# Patient Record
Sex: FEMALE | Race: WHITE | NOT HISPANIC OR LATINO | Employment: UNEMPLOYED | URBAN - METROPOLITAN AREA
[De-identification: names, ages, dates, MRNs, and addresses within clinical notes are randomized per-mention and may not be internally consistent; named-entity substitution may affect disease eponyms.]

---

## 2017-07-05 ENCOUNTER — TELEPHONE (OUTPATIENT)
Dept: OBSTETRICS AND GYNECOLOGY | Facility: CLINIC | Age: 22
End: 2017-07-05

## 2017-07-05 NOTE — TELEPHONE ENCOUNTER
----- Message from Urszula Jerry sent at 7/5/2017  2:29 PM CDT -----  Pt at [phone]//states she had left a message on Monday and has not gotten a return call//on Saturday while she is in the bathtub she had bad cramps and then a big glob????came out//is calling to discuss with //please call asap//jessica/gee

## 2017-07-18 ENCOUNTER — OFFICE VISIT (OUTPATIENT)
Dept: OBSTETRICS AND GYNECOLOGY | Facility: CLINIC | Age: 22
End: 2017-07-18
Payer: COMMERCIAL

## 2017-07-18 ENCOUNTER — LAB VISIT (OUTPATIENT)
Dept: LAB | Facility: HOSPITAL | Age: 22
End: 2017-07-18
Attending: OBSTETRICS & GYNECOLOGY
Payer: COMMERCIAL

## 2017-07-18 VITALS — DIASTOLIC BLOOD PRESSURE: 72 MMHG | SYSTOLIC BLOOD PRESSURE: 104 MMHG | HEART RATE: 78 BPM | WEIGHT: 147.69 LBS

## 2017-07-18 DIAGNOSIS — N92.6 IRREGULAR BLEEDING: ICD-10-CM

## 2017-07-18 DIAGNOSIS — Z30.44 ENCOUNTER FOR SURVEILLANCE OF VAGINAL RING HORMONAL CONTRACEPTIVE DEVICE: ICD-10-CM

## 2017-07-18 DIAGNOSIS — N92.6 IRREGULAR BLEEDING: Primary | ICD-10-CM

## 2017-07-18 LAB — HCG INTACT+B SERPL-ACNC: <1.2 MIU/ML

## 2017-07-18 PROCEDURE — 99999 PR PBB SHADOW E&M-EST. PATIENT-LVL II: CPT | Mod: PBBFAC,,, | Performed by: OBSTETRICS & GYNECOLOGY

## 2017-07-18 PROCEDURE — 99214 OFFICE O/P EST MOD 30 MIN: CPT | Mod: S$GLB,,, | Performed by: OBSTETRICS & GYNECOLOGY

## 2017-07-18 PROCEDURE — 84702 CHORIONIC GONADOTROPIN TEST: CPT

## 2017-07-18 PROCEDURE — 36415 COLL VENOUS BLD VENIPUNCTURE: CPT | Mod: PO

## 2017-07-18 RX ORDER — ETONOGESTREL AND ETHINYL ESTRADIOL VAGINAL RING .015; .12 MG/D; MG/D
1 RING VAGINAL
Qty: 1 EACH | Refills: 5 | Status: SHIPPED | OUTPATIENT
Start: 2017-07-18 | End: 2017-09-18

## 2017-07-18 NOTE — PROGRESS NOTES
"Subjective:       Patient ID: Ghazala Mera is a 21 y.o. female.    Chief Complaint:  No chief complaint on file.      History of Present Illness  HPI  here for problem   Using nuva ring for contraception; reports usually placing new ring q 30d--"just takes one out and replaces with another q 30 d"  May only have 1-2 days of light bleeding  Usually places ring on the 28th of the month; had intense cramping for 10 d after ring placed, so removed the ring; 2 wks (around 7/1/17) later passed 3-4 cm tissue like glob that covered the drain--  Cramping then subsided  Has not had sex since; did not place another nuva ring    Last pap 10/20/2016--negative  GYN & OB History  No LMP recorded.   Date of Last Pap: No result found    OB History   No data available       Review of Systems  Review of Systems   Constitutional: Negative for activity change, appetite change, chills, diaphoresis, fatigue, fever and unexpected weight change.   HENT: Negative for mouth sores and tinnitus.    Eyes: Negative for discharge and visual disturbance.   Respiratory: Negative for cough, shortness of breath and wheezing.    Cardiovascular: Negative for chest pain, palpitations and leg swelling.   Gastrointestinal: Negative for abdominal pain, bloating, blood in stool, constipation, diarrhea, nausea and vomiting.   Endocrine: Negative for diabetes, hair loss, hot flashes, hyperthyroidism and hypothyroidism.   Genitourinary: Positive for menstrual problem. Negative for decreased libido, dyspareunia, dysuria, flank pain, frequency, genital sores, hematuria, menorrhagia, pelvic pain, urgency, vaginal bleeding, vaginal discharge, vaginal pain, dysmenorrhea, urinary incontinence, postcoital bleeding, postmenopausal bleeding and vaginal odor.   Musculoskeletal: Negative for back pain and myalgias.   Skin:  Negative for rash, no acne and hair changes.   Neurological: Negative for seizures, syncope, numbness and headaches.   Hematological: Negative for " adenopathy. Does not bruise/bleed easily.   Psychiatric/Behavioral: Negative for depression and sleep disturbance. The patient is not nervous/anxious.    Breast: Negative for breast mass, breast pain, nipple discharge and skin changes          Objective:    Physical Exam:   Constitutional: She appears well-developed.     Eyes: Conjunctivae and EOM are normal. Pupils are equal, round, and reactive to light.    Neck: Normal range of motion. Neck supple.     Pulmonary/Chest: Effort normal.        Abdominal: Soft.             Musculoskeletal: Normal range of motion.       Neurological: She is alert.    Skin: Skin is warm.    Psychiatric: She has a normal mood and affect.          Assessment  Encounter Diagnoses   Name Primary?    Irregular bleeding Yes    Encounter for surveillance of vaginal ring hormonal contraceptive device                Plan:      Reviewed with pt the correct use of nuva ring; can use as continuous cycle; but should replace q 21 d not q 30d (would be placing 2 days late each month)  reveiwed Sunday start after nuva ring; safe sex  [facility] today  Reassurance given   well woman exam due in november

## 2017-09-18 ENCOUNTER — OFFICE VISIT (OUTPATIENT)
Dept: OBSTETRICS AND GYNECOLOGY | Facility: CLINIC | Age: 22
End: 2017-09-18
Payer: COMMERCIAL

## 2017-09-18 VITALS
HEIGHT: 63 IN | BODY MASS INDEX: 27.93 KG/M2 | SYSTOLIC BLOOD PRESSURE: 97 MMHG | DIASTOLIC BLOOD PRESSURE: 55 MMHG | WEIGHT: 157.63 LBS

## 2017-09-18 DIAGNOSIS — Z32.01 POSITIVE PREGNANCY TEST: Primary | ICD-10-CM

## 2017-09-18 PROCEDURE — 99213 OFFICE O/P EST LOW 20 MIN: CPT | Mod: S$GLB,,, | Performed by: OBSTETRICS & GYNECOLOGY

## 2017-09-18 PROCEDURE — 3008F BODY MASS INDEX DOCD: CPT | Mod: S$GLB,,, | Performed by: OBSTETRICS & GYNECOLOGY

## 2017-09-18 PROCEDURE — 99999 PR PBB SHADOW E&M-EST. PATIENT-LVL II: CPT | Mod: PBBFAC,,, | Performed by: OBSTETRICS & GYNECOLOGY

## 2017-09-18 RX ORDER — PROGESTERONE 100 MG/1
100 CAPSULE ORAL NIGHTLY
Qty: 30 CAPSULE | Refills: 3 | Status: SHIPPED | OUTPATIENT
Start: 2017-09-18 | End: 2018-03-06 | Stop reason: ALTCHOICE

## 2017-09-18 NOTE — PROGRESS NOTES
Subjective:       Patient ID: Ghazala Mera is a 22 y.o. female.    Chief Complaint:  Possible Pregnancy      History of Present Illness  HPI  Missed Menses/ Possible Pregnancy  Patient complains of amenorrhea. She believes she could be pregnant. Pregnancy is desired. Sexual Activity: single partner, contraception: none. Current symptoms also include: positive home pregnancy test. Last period was normal.     Patient's last menstrual period was 2017.     Worried because she has had 2 early sab  Taking prenatal vitamin daily          GYN & OB HistoryPatient's last menstrual period was 2017.   Date of Last Pap: No result found    OB History    Para Term  AB Living   2       2     SAB TAB Ectopic Multiple Live Births   2              # Outcome Date GA Lbr Boyd/2nd Weight Sex Delivery Anes PTL Lv   2 SAB            1 SAB                   Review of Systems  Review of Systems   Constitutional: Negative for activity change, appetite change, chills, diaphoresis, fatigue, fever and unexpected weight change.   HENT: Negative for mouth sores and tinnitus.    Eyes: Negative for discharge and visual disturbance.   Respiratory: Negative for cough, shortness of breath and wheezing.    Cardiovascular: Negative for chest pain, palpitations and leg swelling.   Gastrointestinal: Negative for abdominal pain, bloating, blood in stool, constipation, diarrhea, nausea and vomiting.   Endocrine: Negative for diabetes, hair loss, hot flashes, hyperthyroidism and hypothyroidism.   Genitourinary: Positive for menstrual problem (amenorrhea +upt). Negative for decreased libido, dyspareunia, dysuria, flank pain, frequency, genital sores, hematuria, menorrhagia, pelvic pain, urgency, vaginal bleeding, vaginal discharge, vaginal pain, dysmenorrhea, urinary incontinence, postcoital bleeding, postmenopausal bleeding and vaginal odor.   Musculoskeletal: Negative for back pain and myalgias.   Skin:  Negative for rash, no acne  and hair changes.   Neurological: Negative for seizures, syncope, numbness and headaches.   Hematological: Negative for adenopathy. Does not bruise/bleed easily.   Psychiatric/Behavioral: Negative for depression and sleep disturbance. The patient is not nervous/anxious.    Breast: Negative for breast mass, breast pain, nipple discharge and skin changes          Objective:    Physical Exam:   Constitutional: She appears well-developed.     Eyes: Conjunctivae and EOM are normal. Pupils are equal, round, and reactive to light.    Neck: Normal range of motion. Neck supple.     Pulmonary/Chest: Effort normal.        Abdominal: Soft.             Musculoskeletal: Normal range of motion.       Neurological: She is alert.    Skin: Skin is warm.    Psychiatric: She has a normal mood and affect.          Assessment:        1. Positive pregnancy test               Plan:      Continue prenatal vitamin  Accepts prometrium 100mg qhs until 13w  Ob sono for dating  Ob labs ordered  Will defer gc/ct/pap until past 13w  Ob f/u 3 wks

## 2017-09-19 ENCOUNTER — PROCEDURE VISIT (OUTPATIENT)
Dept: OBSTETRICS AND GYNECOLOGY | Facility: CLINIC | Age: 22
End: 2017-09-19
Payer: COMMERCIAL

## 2017-09-19 ENCOUNTER — LAB VISIT (OUTPATIENT)
Dept: LAB | Facility: HOSPITAL | Age: 22
End: 2017-09-19
Attending: OBSTETRICS & GYNECOLOGY
Payer: COMMERCIAL

## 2017-09-19 DIAGNOSIS — Z32.01 POSITIVE PREGNANCY TEST: ICD-10-CM

## 2017-09-19 LAB
ABO + RH BLD: NORMAL
ANION GAP SERPL CALC-SCNC: 10 MMOL/L
BASOPHILS # BLD AUTO: 0.01 K/UL
BASOPHILS NFR BLD: 0.1 %
BLD GP AB SCN CELLS X3 SERPL QL: NORMAL
BUN SERPL-MCNC: 9 MG/DL
CALCIUM SERPL-MCNC: 9.2 MG/DL
CHLORIDE SERPL-SCNC: 106 MMOL/L
CO2 SERPL-SCNC: 22 MMOL/L
CREAT SERPL-MCNC: 0.7 MG/DL
DIFFERENTIAL METHOD: ABNORMAL
EOSINOPHIL # BLD AUTO: 0.1 K/UL
EOSINOPHIL NFR BLD: 0.7 %
ERYTHROCYTE [DISTWIDTH] IN BLOOD BY AUTOMATED COUNT: 12.9 %
EST. GFR  (AFRICAN AMERICAN): >60 ML/MIN/1.73 M^2
EST. GFR  (NON AFRICAN AMERICAN): >60 ML/MIN/1.73 M^2
GLUCOSE SERPL-MCNC: 80 MG/DL
HCT VFR BLD AUTO: 36.2 %
HGB BLD-MCNC: 12.4 G/DL
LYMPHOCYTES # BLD AUTO: 2.3 K/UL
LYMPHOCYTES NFR BLD: 26.9 %
MCH RBC QN AUTO: 29.2 PG
MCHC RBC AUTO-ENTMCNC: 34.3 G/DL
MCV RBC AUTO: 85 FL
MONOCYTES # BLD AUTO: 0.5 K/UL
MONOCYTES NFR BLD: 5.6 %
NEUTROPHILS # BLD AUTO: 5.6 K/UL
NEUTROPHILS NFR BLD: 66.5 %
PLATELET # BLD AUTO: 306 K/UL
PMV BLD AUTO: 10.6 FL
POTASSIUM SERPL-SCNC: 3.4 MMOL/L
RBC # BLD AUTO: 4.24 M/UL
SODIUM SERPL-SCNC: 138 MMOL/L
WBC # BLD AUTO: 8.37 K/UL

## 2017-09-19 PROCEDURE — 76801 OB US < 14 WKS SINGLE FETUS: CPT | Mod: S$GLB,,, | Performed by: OBSTETRICS & GYNECOLOGY

## 2017-09-19 PROCEDURE — 80048 BASIC METABOLIC PNL TOTAL CA: CPT

## 2017-09-19 PROCEDURE — 86850 RBC ANTIBODY SCREEN: CPT

## 2017-09-19 PROCEDURE — 86592 SYPHILIS TEST NON-TREP QUAL: CPT

## 2017-09-19 PROCEDURE — 36415 COLL VENOUS BLD VENIPUNCTURE: CPT | Mod: PO

## 2017-09-19 PROCEDURE — 86900 BLOOD TYPING SEROLOGIC ABO: CPT

## 2017-09-19 PROCEDURE — 85025 COMPLETE CBC W/AUTO DIFF WBC: CPT

## 2017-09-19 PROCEDURE — 87340 HEPATITIS B SURFACE AG IA: CPT

## 2017-09-19 PROCEDURE — 86703 HIV-1/HIV-2 1 RESULT ANTBDY: CPT

## 2017-09-19 PROCEDURE — 86762 RUBELLA ANTIBODY: CPT

## 2017-09-20 LAB
HBV SURFACE AG SERPL QL IA: NEGATIVE
HIV 1+2 AB+HIV1 P24 AG SERPL QL IA: NEGATIVE
RUBV IGG SER-ACNC: 14.4 IU/ML
RUBV IGG SER-IMP: REACTIVE

## 2017-09-21 LAB — RPR SER QL: NORMAL

## 2017-10-02 ENCOUNTER — ROUTINE PRENATAL (OUTPATIENT)
Dept: OBSTETRICS AND GYNECOLOGY | Facility: CLINIC | Age: 22
End: 2017-10-02
Payer: COMMERCIAL

## 2017-10-02 DIAGNOSIS — Z3A.08 8 WEEKS GESTATION OF PREGNANCY: Primary | ICD-10-CM

## 2017-10-02 DIAGNOSIS — O09.291 HISTORY OF MISCARRIAGE, CURRENTLY PREGNANT, FIRST TRIMESTER: ICD-10-CM

## 2017-10-02 PROCEDURE — 99999 PR PBB SHADOW E&M-EST. PATIENT-LVL II: CPT | Mod: PBBFAC,,, | Performed by: OBSTETRICS & GYNECOLOGY

## 2017-10-02 PROCEDURE — 0500F INITIAL PRENATAL CARE VISIT: CPT | Mod: S$GLB,,, | Performed by: OBSTETRICS & GYNECOLOGY

## 2017-10-09 VITALS — DIASTOLIC BLOOD PRESSURE: 62 MMHG | SYSTOLIC BLOOD PRESSURE: 102 MMHG

## 2017-10-09 NOTE — PROGRESS NOTES
VIOLA welcomed into collaborative MD/CNM practice.   H/o x 2 early SABs. Patient slightly anxious.  Started on Prometrium 100mg HS.   Denies having any bleeding/ spotting or cramping at present.   Patient with early US to confirm viability. EDC incongruent by LMP. Use assigned EDC of 5/8/2018. Patient aware.  Reviewed all labs to date and are normal.  Taking PNV QD.  Cervical cx after 13 wks . Can do on urine.   Recommended wt gain of 25-35 #.   Has blue bag with A's-Z's or pregnancy.  Discussed nutritional needs, rest, adequate fluids.   Common discomforts discussed. Some nausea and vomiting but wnl for pregnancy and prometrium use.   All questions answered.   RTC 2 wks to auscultate FHT's for patient reassurance.

## 2017-10-16 ENCOUNTER — ROUTINE PRENATAL (OUTPATIENT)
Dept: OBSTETRICS AND GYNECOLOGY | Facility: CLINIC | Age: 22
End: 2017-10-16
Payer: COMMERCIAL

## 2017-10-16 VITALS
DIASTOLIC BLOOD PRESSURE: 64 MMHG | WEIGHT: 159.06 LBS | SYSTOLIC BLOOD PRESSURE: 100 MMHG | BODY MASS INDEX: 28.18 KG/M2

## 2017-10-16 DIAGNOSIS — Z3A.10 10 WEEKS GESTATION OF PREGNANCY: Primary | ICD-10-CM

## 2017-10-16 DIAGNOSIS — K08.89 TOOTH PAIN: ICD-10-CM

## 2017-10-16 PROCEDURE — 99999 PR PBB SHADOW E&M-EST. PATIENT-LVL III: CPT | Mod: PBBFAC,,, | Performed by: OBSTETRICS & GYNECOLOGY

## 2017-10-16 PROCEDURE — 0502F SUBSEQUENT PRENATAL CARE: CPT | Mod: S$GLB,,, | Performed by: OBSTETRICS & GYNECOLOGY

## 2017-10-16 PROCEDURE — 87591 N.GONORRHOEAE DNA AMP PROB: CPT

## 2017-10-17 LAB
C TRACH DNA SPEC QL NAA+PROBE: DETECTED
N GONORRHOEA DNA SPEC QL NAA+PROBE: NOT DETECTED

## 2017-10-18 DIAGNOSIS — A74.9 CHLAMYDIA INFECTION: Primary | ICD-10-CM

## 2017-10-18 RX ORDER — AZITHROMYCIN 500 MG/1
1000 TABLET, FILM COATED ORAL ONCE
Qty: 2 TABLET | Refills: 1 | Status: SHIPPED | OUTPATIENT
Start: 2017-10-18 | End: 2017-10-18

## 2017-10-19 ENCOUNTER — TELEPHONE (OUTPATIENT)
Dept: OBSTETRICS AND GYNECOLOGY | Facility: CLINIC | Age: 22
End: 2017-10-19

## 2017-10-19 NOTE — TELEPHONE ENCOUNTER
----- Message from Tiana Dominguez CNM sent at 10/18/2017 10:04 PM CDT -----  Jaelyn,    Can you please call the the patient above and let her know her cultures were positive for chlamydia.      I have sent in a prescription for zithromax.  Take as directed.  There is a refill to be used for her partner to take if he is not allergic to azithromycin.       Advise safe sex during pregnancy!    Thanks   Tiana

## 2017-10-19 NOTE — TELEPHONE ENCOUNTER
Patient notified that she was positive for chlamydia and that abx sent to pharmacy with refill for partner.  Patient given safe sex precautions and advised no intercourse for at least 2 wks after both treated.  Patient verbalized understanding.

## 2017-10-24 ENCOUNTER — TELEPHONE (OUTPATIENT)
Dept: OBSTETRICS AND GYNECOLOGY | Facility: CLINIC | Age: 22
End: 2017-10-24

## 2017-10-24 NOTE — TELEPHONE ENCOUNTER
Patient tested positive for chlamydia.  Patient calling to ask how long after treatment should she wait to resume intercourse.  Patient stated she and partner took medication last week.  Patient reminded that she should wait at least 2 weeks after treatment and that she should discuss further with provider at next visit.  Patient verbalized understanding.

## 2017-10-24 NOTE — TELEPHONE ENCOUNTER
----- Message from Laureen Robertson sent at 10/24/2017  8:07 AM CDT -----  Contact: Patient  Patient would like to speak to nurse regarding an itching that she is having, please call her back at [phone]. Thank you

## 2017-10-31 ENCOUNTER — TELEPHONE (OUTPATIENT)
Dept: OBSTETRICS AND GYNECOLOGY | Facility: CLINIC | Age: 22
End: 2017-10-31

## 2017-10-31 NOTE — TELEPHONE ENCOUNTER
Left messages for the pt. to call back. krystyna tamez    Patient would like to come in to have labs done she thinks she may have a Bladder or UTI infection, Please call her at [phone].

## 2017-11-07 ENCOUNTER — ROUTINE PRENATAL (OUTPATIENT)
Dept: OBSTETRICS AND GYNECOLOGY | Facility: CLINIC | Age: 22
End: 2017-11-07
Payer: COMMERCIAL

## 2017-11-07 VITALS — SYSTOLIC BLOOD PRESSURE: 100 MMHG | DIASTOLIC BLOOD PRESSURE: 68 MMHG | WEIGHT: 158 LBS | BODY MASS INDEX: 27.99 KG/M2

## 2017-11-07 DIAGNOSIS — A74.9 CHLAMYDIA INFECTION: ICD-10-CM

## 2017-11-07 DIAGNOSIS — Z36.89 ENCOUNTER FOR FETAL ANATOMIC SURVEY: ICD-10-CM

## 2017-11-07 DIAGNOSIS — R51.9 FREQUENT HEADACHES: ICD-10-CM

## 2017-11-07 DIAGNOSIS — Z3A.14 14 WEEKS GESTATION OF PREGNANCY: Primary | ICD-10-CM

## 2017-11-07 DIAGNOSIS — O21.9 NAUSEA AND VOMITING IN PREGNANCY: ICD-10-CM

## 2017-11-07 DIAGNOSIS — R35.0 URINARY FREQUENCY: ICD-10-CM

## 2017-11-07 PROCEDURE — 0502F SUBSEQUENT PRENATAL CARE: CPT | Mod: S$GLB,,, | Performed by: OBSTETRICS & GYNECOLOGY

## 2017-11-07 PROCEDURE — 87086 URINE CULTURE/COLONY COUNT: CPT

## 2017-11-07 PROCEDURE — 99999 PR PBB SHADOW E&M-EST. PATIENT-LVL III: CPT | Mod: PBBFAC,,, | Performed by: OBSTETRICS & GYNECOLOGY

## 2017-11-07 RX ORDER — BUTALBITAL, ACETAMINOPHEN AND CAFFEINE 50; 325; 40 MG/1; MG/1; MG/1
1 TABLET ORAL EVERY 4 HOURS PRN
Qty: 20 TABLET | Refills: 0 | Status: SHIPPED | OUTPATIENT
Start: 2017-11-07 | End: 2017-12-07

## 2017-11-07 RX ORDER — PROMETHAZINE HYDROCHLORIDE 25 MG/1
25 TABLET ORAL EVERY 6 HOURS PRN
Qty: 20 TABLET | Refills: 1 | Status: SHIPPED | OUTPATIENT
Start: 2017-11-07 | End: 2018-06-21

## 2017-11-07 NOTE — PROGRESS NOTES
H/o frequency and dysuria, never came in for UA.  This better, but having n/v and headaches.  No fever or flank pain.   Only lost 1#.  Tylenol ineffective.  Will do Urine cx today.   H/o chlamy. Both she and partner tx. ARMAND next ov.  Anatomy scan and cervical length next ov.  Stop prometrium as no noted bleeding or evidence of pending miscarriage.   Doing well otherwise.   RTC 4 wks.

## 2017-11-08 LAB — BACTERIA UR CULT: NO GROWTH

## 2017-11-13 ENCOUNTER — ROUTINE PRENATAL (OUTPATIENT)
Dept: OBSTETRICS AND GYNECOLOGY | Facility: CLINIC | Age: 22
End: 2017-11-13
Payer: COMMERCIAL

## 2017-11-13 VITALS — SYSTOLIC BLOOD PRESSURE: 95 MMHG | WEIGHT: 158 LBS | DIASTOLIC BLOOD PRESSURE: 62 MMHG | BODY MASS INDEX: 27.99 KG/M2

## 2017-11-13 DIAGNOSIS — Z3A.14 14 WEEKS GESTATION OF PREGNANCY: Primary | ICD-10-CM

## 2017-11-13 PROCEDURE — 0502F SUBSEQUENT PRENATAL CARE: CPT | Mod: S$GLB,,, | Performed by: OBSTETRICS & GYNECOLOGY

## 2017-11-13 PROCEDURE — 99999 PR PBB SHADOW E&M-EST. PATIENT-LVL II: CPT | Mod: PBBFAC,,, | Performed by: OBSTETRICS & GYNECOLOGY

## 2017-11-13 NOTE — PROGRESS NOTES
"Urine culture negative.  Took Fioricet once this past week and "really helped".  Doing well!.  See 3 wks for US, cervical length, repeat Culture and ov.  To call if having problems till then.     "

## 2017-12-04 ENCOUNTER — PROCEDURE VISIT (OUTPATIENT)
Dept: OBSTETRICS AND GYNECOLOGY | Facility: CLINIC | Age: 22
End: 2017-12-04
Payer: COMMERCIAL

## 2017-12-04 ENCOUNTER — ROUTINE PRENATAL (OUTPATIENT)
Dept: OBSTETRICS AND GYNECOLOGY | Facility: CLINIC | Age: 22
End: 2017-12-04
Payer: COMMERCIAL

## 2017-12-04 VITALS
WEIGHT: 169.31 LBS | BODY MASS INDEX: 29.99 KG/M2 | DIASTOLIC BLOOD PRESSURE: 61 MMHG | SYSTOLIC BLOOD PRESSURE: 100 MMHG

## 2017-12-04 DIAGNOSIS — F32.A DEPRESSION AFFECTING PREGNANCY: ICD-10-CM

## 2017-12-04 DIAGNOSIS — Z36.89 ENCOUNTER FOR FETAL ANATOMIC SURVEY: ICD-10-CM

## 2017-12-04 DIAGNOSIS — Z86.19 HISTORY OF CHLAMYDIA: ICD-10-CM

## 2017-12-04 DIAGNOSIS — Z86.59 HISTORY OF DEPRESSION: ICD-10-CM

## 2017-12-04 DIAGNOSIS — Z3A.17 17 WEEKS GESTATION OF PREGNANCY: Primary | ICD-10-CM

## 2017-12-04 DIAGNOSIS — O99.340 DEPRESSION AFFECTING PREGNANCY: ICD-10-CM

## 2017-12-04 PROCEDURE — 76805 OB US >/= 14 WKS SNGL FETUS: CPT | Mod: S$GLB,,, | Performed by: OBSTETRICS & GYNECOLOGY

## 2017-12-04 PROCEDURE — 87591 N.GONORRHOEAE DNA AMP PROB: CPT

## 2017-12-04 PROCEDURE — 99999 PR PBB SHADOW E&M-EST. PATIENT-LVL III: CPT | Mod: PBBFAC,,, | Performed by: OBSTETRICS & GYNECOLOGY

## 2017-12-04 PROCEDURE — 0502F SUBSEQUENT PRENATAL CARE: CPT | Mod: S$GLB,,, | Performed by: OBSTETRICS & GYNECOLOGY

## 2017-12-04 RX ORDER — FLUOXETINE HYDROCHLORIDE 20 MG/1
20 CAPSULE ORAL DAILY
Qty: 30 CAPSULE | Refills: 2 | Status: SHIPPED | OUTPATIENT
Start: 2017-12-04 | End: 2020-06-09

## 2017-12-04 NOTE — PROGRESS NOTES
"Anatomy U/S today- bpm, breech, placenta posterior, normal amount of amniotic fluid,  g, visualized anatomy WNL. Supoptimal views of nose/lip, heart and ACI, will repeat scan in 1 month, cervical length 44mm  Denies cramping, bleeding, lof  Reports quickening  ARMAND for + Chlamydia completed  States has been feeling emotionally unstable lately and requests a RX for Prozac, states she has taken in the past and has worked well for her, denies suicidal ideation, states "I just feel all over the place."  Rx for Prozac sent to preferred pharmacy, patient will report worsening symptoms, thoughts of suicide  Rx for 20 mg Q day, 30 tablets, 2 refills  Discussed CDC recommendation for the flu vaccine, declines at this time  RTC in 1 week for f/u regarding new Rx for Prozac  RTC in 1 month for routine OB and u/s for supoptimal views  A. Natali SNM      "

## 2017-12-05 LAB
C TRACH DNA SPEC QL NAA+PROBE: NOT DETECTED
N GONORRHOEA DNA SPEC QL NAA+PROBE: NOT DETECTED

## 2017-12-11 ENCOUNTER — ROUTINE PRENATAL (OUTPATIENT)
Dept: OBSTETRICS AND GYNECOLOGY | Facility: CLINIC | Age: 22
End: 2017-12-11
Payer: COMMERCIAL

## 2017-12-11 VITALS
SYSTOLIC BLOOD PRESSURE: 111 MMHG | WEIGHT: 169.88 LBS | BODY MASS INDEX: 30.09 KG/M2 | DIASTOLIC BLOOD PRESSURE: 45 MMHG

## 2017-12-11 DIAGNOSIS — Z3A.18 18 WEEKS GESTATION OF PREGNANCY: Primary | ICD-10-CM

## 2017-12-11 DIAGNOSIS — F32.A DEPRESSION AFFECTING PREGNANCY IN SECOND TRIMESTER, ANTEPARTUM: ICD-10-CM

## 2017-12-11 DIAGNOSIS — O99.342 DEPRESSION AFFECTING PREGNANCY IN SECOND TRIMESTER, ANTEPARTUM: ICD-10-CM

## 2017-12-11 PROCEDURE — 0502F SUBSEQUENT PRENATAL CARE: CPT | Mod: S$GLB,,, | Performed by: OBSTETRICS & GYNECOLOGY

## 2017-12-11 PROCEDURE — 99999 PR PBB SHADOW E&M-EST. PATIENT-LVL III: CPT | Mod: PBBFAC,,, | Performed by: OBSTETRICS & GYNECOLOGY

## 2017-12-11 NOTE — PROGRESS NOTES
"Patient here for f/u regarding new Rx of Prozac. States she feels "a little better," but knows that full effect of medication takes more time. States she does not feel worse, no suicidal thoughts. Encouraged to call immediately with worsening depression.   Denies cramping, bleeding, lof  Diastolic BP noted to be 45, patient asymptomatic  Informed of negative Chlamydia ARMAND  RTC on 12/20/17 for routine OB and repeat U/S for suboptimal views  A. Natali SNM    "

## 2018-01-02 ENCOUNTER — ROUTINE PRENATAL (OUTPATIENT)
Dept: OBSTETRICS AND GYNECOLOGY | Facility: CLINIC | Age: 23
End: 2018-01-02
Payer: COMMERCIAL

## 2018-01-02 ENCOUNTER — PROCEDURE VISIT (OUTPATIENT)
Dept: OBSTETRICS AND GYNECOLOGY | Facility: CLINIC | Age: 23
End: 2018-01-02
Payer: COMMERCIAL

## 2018-01-02 VITALS — WEIGHT: 179 LBS | BODY MASS INDEX: 31.71 KG/M2 | DIASTOLIC BLOOD PRESSURE: 63 MMHG | SYSTOLIC BLOOD PRESSURE: 98 MMHG

## 2018-01-02 DIAGNOSIS — Z3A.22 22 WEEKS GESTATION OF PREGNANCY: Primary | ICD-10-CM

## 2018-01-02 DIAGNOSIS — Z3A.17 17 WEEKS GESTATION OF PREGNANCY: ICD-10-CM

## 2018-01-02 PROBLEM — K08.89 TOOTH PAIN: Status: RESOLVED | Noted: 2017-10-16 | Resolved: 2018-01-02

## 2018-01-02 PROCEDURE — 0502F SUBSEQUENT PRENATAL CARE: CPT | Mod: S$GLB,,, | Performed by: OBSTETRICS & GYNECOLOGY

## 2018-01-02 PROCEDURE — 76816 OB US FOLLOW-UP PER FETUS: CPT | Mod: S$GLB,,, | Performed by: OBSTETRICS & GYNECOLOGY

## 2018-01-02 PROCEDURE — 99999 PR PBB SHADOW E&M-EST. PATIENT-LVL III: CPT | Mod: PBBFAC,,, | Performed by: OBSTETRICS & GYNECOLOGY

## 2018-01-03 NOTE — PROGRESS NOTES
"US: Good growth @ 66%tile, unstable lie, post placenta 3 v cord, normal dee. All normal except still sub-opt heart view.   Will check next month with limited scan.  Declined any genetic tests to date.   Wt gain of 10+. TWG of 22#. Advised to watch caloric intake due to rapid wt gain to prevent increasing discomforts of pregnancy as swelling, lower back ache.   Patient admits eating more, but "feeling really good again since starting Prozac".  Striking nice balance in health and emotional well being at present.  Will focus on healthier choices now.  Occasional weak spells, but no syncope.   Good fm/fh.  No cramping or spotting.   RTC 1 month.   "

## 2018-02-05 ENCOUNTER — ROUTINE PRENATAL (OUTPATIENT)
Dept: OBSTETRICS AND GYNECOLOGY | Facility: CLINIC | Age: 23
End: 2018-02-05
Payer: COMMERCIAL

## 2018-02-05 ENCOUNTER — PROCEDURE VISIT (OUTPATIENT)
Dept: OBSTETRICS AND GYNECOLOGY | Facility: CLINIC | Age: 23
End: 2018-02-05
Payer: COMMERCIAL

## 2018-02-05 VITALS
DIASTOLIC BLOOD PRESSURE: 78 MMHG | WEIGHT: 187.38 LBS | SYSTOLIC BLOOD PRESSURE: 118 MMHG | BODY MASS INDEX: 33.19 KG/M2

## 2018-02-05 DIAGNOSIS — Z34.02 ENCOUNTER FOR SUPERVISION OF NORMAL FIRST PREGNANCY IN SECOND TRIMESTER: Primary | ICD-10-CM

## 2018-02-05 DIAGNOSIS — Z36.9 PRENATAL SCREENING ENCOUNTER: ICD-10-CM

## 2018-02-05 DIAGNOSIS — Z3A.22 22 WEEKS GESTATION OF PREGNANCY: ICD-10-CM

## 2018-02-05 PROCEDURE — 0502F SUBSEQUENT PRENATAL CARE: CPT | Mod: S$GLB,,, | Performed by: OBSTETRICS & GYNECOLOGY

## 2018-02-05 PROCEDURE — 99999 PR PBB SHADOW E&M-EST. PATIENT-LVL III: CPT | Mod: PBBFAC,,, | Performed by: OBSTETRICS & GYNECOLOGY

## 2018-02-05 PROCEDURE — 76816 OB US FOLLOW-UP PER FETUS: CPT | Mod: S$GLB,,, | Performed by: OBSTETRICS & GYNECOLOGY

## 2018-02-05 NOTE — PROGRESS NOTES
+fetal movement, no srom, no vag bleeding  F/u sono wnl; lvot wnl; anatomy survey complete  C/o nausea; declines zofran and phenergan  Glucola/cbc ordered  reveiwed kick counts/ptl  Ob class schedule given;   Discuss tdap next visit

## 2018-02-20 ENCOUNTER — LAB VISIT (OUTPATIENT)
Dept: LAB | Facility: HOSPITAL | Age: 23
End: 2018-02-20
Attending: OBSTETRICS & GYNECOLOGY
Payer: COMMERCIAL

## 2018-02-20 ENCOUNTER — ROUTINE PRENATAL (OUTPATIENT)
Dept: OBSTETRICS AND GYNECOLOGY | Facility: CLINIC | Age: 23
End: 2018-02-20
Payer: COMMERCIAL

## 2018-02-20 VITALS
BODY MASS INDEX: 33.78 KG/M2 | SYSTOLIC BLOOD PRESSURE: 108 MMHG | WEIGHT: 190.69 LBS | DIASTOLIC BLOOD PRESSURE: 62 MMHG

## 2018-02-20 DIAGNOSIS — Z36.9 PRENATAL SCREENING ENCOUNTER: ICD-10-CM

## 2018-02-20 DIAGNOSIS — Z34.03 ENCOUNTER FOR SUPERVISION OF NORMAL FIRST PREGNANCY IN THIRD TRIMESTER: Primary | ICD-10-CM

## 2018-02-20 LAB
ERYTHROCYTE [DISTWIDTH] IN BLOOD BY AUTOMATED COUNT: 13.1 %
GLUCOSE SERPL-MCNC: 67 MG/DL
HCT VFR BLD AUTO: 32.3 %
HGB BLD-MCNC: 10.3 G/DL
MCH RBC QN AUTO: 29.3 PG
MCHC RBC AUTO-ENTMCNC: 31.9 G/DL
MCV RBC AUTO: 92 FL
PLATELET # BLD AUTO: 312 K/UL
PMV BLD AUTO: 10.4 FL
RBC # BLD AUTO: 3.52 M/UL
WBC # BLD AUTO: 11.83 K/UL

## 2018-02-20 PROCEDURE — 85027 COMPLETE CBC AUTOMATED: CPT

## 2018-02-20 PROCEDURE — 82950 GLUCOSE TEST: CPT

## 2018-02-20 PROCEDURE — 90471 IMMUNIZATION ADMIN: CPT | Mod: S$GLB,,, | Performed by: OBSTETRICS & GYNECOLOGY

## 2018-02-20 PROCEDURE — 36415 COLL VENOUS BLD VENIPUNCTURE: CPT | Mod: PO

## 2018-02-20 PROCEDURE — 90715 TDAP VACCINE 7 YRS/> IM: CPT | Mod: S$GLB,,, | Performed by: OBSTETRICS & GYNECOLOGY

## 2018-02-20 PROCEDURE — 99999 PR PBB SHADOW E&M-EST. PATIENT-LVL III: CPT | Mod: PBBFAC,,, | Performed by: OBSTETRICS & GYNECOLOGY

## 2018-02-20 PROCEDURE — 0502F SUBSEQUENT PRENATAL CARE: CPT | Mod: S$GLB,,, | Performed by: OBSTETRICS & GYNECOLOGY

## 2018-02-20 NOTE — PROGRESS NOTES
+fetal movement, no srom, no vag bleeding  Glucola/cbc today  tdap today  Plans mini pill then patch for contraception  reveiwed keep baby close  Feels great!

## 2018-02-21 ENCOUNTER — PATIENT MESSAGE (OUTPATIENT)
Dept: OBSTETRICS AND GYNECOLOGY | Facility: HOSPITAL | Age: 23
End: 2018-02-21

## 2018-03-05 ENCOUNTER — TELEPHONE (OUTPATIENT)
Dept: OBSTETRICS AND GYNECOLOGY | Facility: CLINIC | Age: 23
End: 2018-03-05

## 2018-03-05 NOTE — TELEPHONE ENCOUNTER
Pt called stating she had an episode on nausea with fatigue and sweating. I explained those are common symptoms following vomiting. She's been advised to take her phenergan for nausea. Continue to monitor her symptoms and if anything worsens go to L&D. Pt verbalizes understanding.

## 2018-03-06 ENCOUNTER — ROUTINE PRENATAL (OUTPATIENT)
Dept: OBSTETRICS AND GYNECOLOGY | Facility: CLINIC | Age: 23
End: 2018-03-06
Payer: COMMERCIAL

## 2018-03-06 VITALS — BODY MASS INDEX: 34.33 KG/M2 | WEIGHT: 193.81 LBS | DIASTOLIC BLOOD PRESSURE: 64 MMHG | SYSTOLIC BLOOD PRESSURE: 90 MMHG

## 2018-03-06 DIAGNOSIS — Z34.93 NORMAL PREGNANCY IN THIRD TRIMESTER: Primary | ICD-10-CM

## 2018-03-06 DIAGNOSIS — Z3A.31 31 WEEKS GESTATION OF PREGNANCY: ICD-10-CM

## 2018-03-06 DIAGNOSIS — O09.291 HISTORY OF MISCARRIAGE, CURRENTLY PREGNANT, FIRST TRIMESTER: ICD-10-CM

## 2018-03-06 DIAGNOSIS — A74.9 CHLAMYDIA INFECTION: ICD-10-CM

## 2018-03-06 PROCEDURE — 99999 PR PBB SHADOW E&M-EST. PATIENT-LVL II: CPT | Mod: PBBFAC,,, | Performed by: MIDWIFE

## 2018-03-06 PROCEDURE — 0502F SUBSEQUENT PRENATAL CARE: CPT | Mod: S$GLB,,, | Performed by: MIDWIFE

## 2018-03-06 NOTE — PROGRESS NOTES
Doing well, good fm, pt having episodes of n/v, sweating, probably hypoglycemia after diet recall, encouraged protein increase, otherwise well, rtc 2 wks

## 2018-03-20 ENCOUNTER — ROUTINE PRENATAL (OUTPATIENT)
Dept: OBSTETRICS AND GYNECOLOGY | Facility: CLINIC | Age: 23
End: 2018-03-20
Payer: COMMERCIAL

## 2018-03-20 VITALS — BODY MASS INDEX: 34.37 KG/M2 | DIASTOLIC BLOOD PRESSURE: 69 MMHG | SYSTOLIC BLOOD PRESSURE: 105 MMHG | WEIGHT: 194 LBS

## 2018-03-20 DIAGNOSIS — O26.893 DYSURIA DURING PREGNANCY IN THIRD TRIMESTER: ICD-10-CM

## 2018-03-20 DIAGNOSIS — E16.2 HYPOGLYCEMIA: ICD-10-CM

## 2018-03-20 DIAGNOSIS — R30.0 DYSURIA DURING PREGNANCY IN THIRD TRIMESTER: ICD-10-CM

## 2018-03-20 DIAGNOSIS — Z86.19 HISTORY OF CHLAMYDIA: ICD-10-CM

## 2018-03-20 DIAGNOSIS — Z34.80 ENCOUNTER FOR SUPERVISION OF NORMAL PREGNANCY IN MULTIGRAVIDA: Primary | ICD-10-CM

## 2018-03-20 LAB
AMORPH CRY UR QL COMP ASSIST: ABNORMAL
BACTERIA #/AREA URNS AUTO: ABNORMAL /HPF
BILIRUB UR QL STRIP: NEGATIVE
CLARITY UR REFRACT.AUTO: ABNORMAL
COLOR UR AUTO: ABNORMAL
GLUCOSE UR QL STRIP: NEGATIVE
HGB UR QL STRIP: NEGATIVE
KETONES UR QL STRIP: NEGATIVE
LEUKOCYTE ESTERASE UR QL STRIP: ABNORMAL
MICROSCOPIC COMMENT: ABNORMAL
NITRITE UR QL STRIP: NEGATIVE
PH UR STRIP: 5 [PH] (ref 5–8)
PROT UR QL STRIP: NEGATIVE
SP GR UR STRIP: 1.02 (ref 1–1.03)
SQUAMOUS #/AREA URNS AUTO: 8 /HPF
URN SPEC COLLECT METH UR: ABNORMAL
UROBILINOGEN UR STRIP-ACNC: NEGATIVE EU/DL
WBC #/AREA URNS AUTO: 8 /HPF (ref 0–5)

## 2018-03-20 PROCEDURE — 99999 PR PBB SHADOW E&M-EST. PATIENT-LVL III: CPT | Mod: PBBFAC,,, | Performed by: ADVANCED PRACTICE MIDWIFE

## 2018-03-20 PROCEDURE — 81001 URINALYSIS AUTO W/SCOPE: CPT

## 2018-03-20 PROCEDURE — 0502F SUBSEQUENT PRENATAL CARE: CPT | Mod: S$GLB,,, | Performed by: ADVANCED PRACTICE MIDWIFE

## 2018-03-20 PROCEDURE — 87491 CHLMYD TRACH DNA AMP PROBE: CPT

## 2018-03-20 PROCEDURE — 87086 URINE CULTURE/COLONY COUNT: CPT

## 2018-03-20 NOTE — PROGRESS NOTES
"Doing well, good fetal movement. Denies contractions.  Continues to have hypoglycemic episodes. Instructed to eat protein rich snacks every 3 hours and increase water intake.   Also reports one episode of "leaking" on Friday night when stood up and had fluid running down her leg and soaked her shorts. No further episodes of leaking, no bleeding, no cramping.   Nitrazine negative and Fern negative.  Doing well on Prozac.   Encouraged to take BF and child birth class.       Coffective counseling sheet Fall In Love discussed with mother. Reinforced immediate skin to skin, the magic first hour, importance of the first feeding and delaying routine procedures. Encouraged mother to download Coffective mobile janell if she has not already done so. Mother verbalizes understanding.      "

## 2018-03-21 LAB
C TRACH DNA SPEC QL NAA+PROBE: NOT DETECTED
N GONORRHOEA DNA SPEC QL NAA+PROBE: NOT DETECTED

## 2018-03-22 LAB — BACTERIA UR CULT: NORMAL

## 2018-04-11 ENCOUNTER — ROUTINE PRENATAL (OUTPATIENT)
Dept: OBSTETRICS AND GYNECOLOGY | Facility: CLINIC | Age: 23
End: 2018-04-11
Payer: COMMERCIAL

## 2018-04-11 ENCOUNTER — PROCEDURE VISIT (OUTPATIENT)
Dept: OBSTETRICS AND GYNECOLOGY | Facility: CLINIC | Age: 23
End: 2018-04-11
Payer: COMMERCIAL

## 2018-04-11 VITALS
SYSTOLIC BLOOD PRESSURE: 113 MMHG | DIASTOLIC BLOOD PRESSURE: 65 MMHG | BODY MASS INDEX: 35.54 KG/M2 | WEIGHT: 200.63 LBS

## 2018-04-11 DIAGNOSIS — O40.3XX0 POLYHYDRAMNIOS IN THIRD TRIMESTER COMPLICATION, SINGLE OR UNSPECIFIED FETUS: ICD-10-CM

## 2018-04-11 DIAGNOSIS — O99.213 OBESITY DURING PREGNANCY IN THIRD TRIMESTER: ICD-10-CM

## 2018-04-11 DIAGNOSIS — Z3A.36 36 WEEKS GESTATION OF PREGNANCY: ICD-10-CM

## 2018-04-11 DIAGNOSIS — O99.213 OBESITY DURING PREGNANCY IN THIRD TRIMESTER: Primary | ICD-10-CM

## 2018-04-11 PROCEDURE — 99999 PR PBB SHADOW E&M-EST. PATIENT-LVL II: CPT | Mod: PBBFAC,,, | Performed by: MIDWIFE

## 2018-04-11 PROCEDURE — 76815 OB US LIMITED FETUS(S): CPT | Mod: 59,S$GLB,, | Performed by: OBSTETRICS & GYNECOLOGY

## 2018-04-11 PROCEDURE — 87081 CULTURE SCREEN ONLY: CPT

## 2018-04-11 PROCEDURE — 76819 FETAL BIOPHYS PROFIL W/O NST: CPT | Mod: 59,S$GLB,, | Performed by: OBSTETRICS & GYNECOLOGY

## 2018-04-11 PROCEDURE — 0502F SUBSEQUENT PRENATAL CARE: CPT | Mod: S$GLB,,, | Performed by: MIDWIFE

## 2018-04-11 NOTE — PROGRESS NOTES
Doing well, good fm, GBS today, reviewed when to go to hospital, reviewed s2s, rooming in, delayed bathing, BF, pt wants to try but feels she is too modest and may not feel comfortable, US today, BPP 8/8, MVP 9.2, poly, will rescan next week, EFW 7#, 64%,  rtc 1 wk

## 2018-04-14 LAB — BACTERIA SPEC AEROBE CULT: NORMAL

## 2018-04-18 ENCOUNTER — PROCEDURE VISIT (OUTPATIENT)
Dept: OBSTETRICS AND GYNECOLOGY | Facility: CLINIC | Age: 23
End: 2018-04-18
Payer: COMMERCIAL

## 2018-04-18 ENCOUNTER — ROUTINE PRENATAL (OUTPATIENT)
Dept: OBSTETRICS AND GYNECOLOGY | Facility: CLINIC | Age: 23
End: 2018-04-18
Payer: COMMERCIAL

## 2018-04-18 VITALS — WEIGHT: 204.38 LBS | BODY MASS INDEX: 36.2 KG/M2 | SYSTOLIC BLOOD PRESSURE: 90 MMHG | DIASTOLIC BLOOD PRESSURE: 64 MMHG

## 2018-04-18 DIAGNOSIS — O40.3XX0 POLYHYDRAMNIOS IN THIRD TRIMESTER COMPLICATION, SINGLE OR UNSPECIFIED FETUS: ICD-10-CM

## 2018-04-18 DIAGNOSIS — Z3A.37 37 WEEKS GESTATION OF PREGNANCY: ICD-10-CM

## 2018-04-18 DIAGNOSIS — O40.3XX0 POLYHYDRAMNIOS IN THIRD TRIMESTER COMPLICATION, SINGLE OR UNSPECIFIED FETUS: Primary | ICD-10-CM

## 2018-04-18 PROCEDURE — 99999 PR PBB SHADOW E&M-EST. PATIENT-LVL II: CPT | Mod: PBBFAC,,, | Performed by: MIDWIFE

## 2018-04-18 PROCEDURE — 0502F SUBSEQUENT PRENATAL CARE: CPT | Mod: S$GLB,,, | Performed by: MIDWIFE

## 2018-04-18 PROCEDURE — 76819 FETAL BIOPHYS PROFIL W/O NST: CPT | Mod: S$GLB,,, | Performed by: OBSTETRICS & GYNECOLOGY

## 2018-04-18 NOTE — PROGRESS NOTES
Doing well, good fm, BPP 8/8, MVP 11.7, poly stablereviewed when to go to hospital, rtc 1 wk w/ BPP

## 2018-04-25 ENCOUNTER — PROCEDURE VISIT (OUTPATIENT)
Dept: OBSTETRICS AND GYNECOLOGY | Facility: CLINIC | Age: 23
End: 2018-04-25
Payer: COMMERCIAL

## 2018-04-25 ENCOUNTER — ROUTINE PRENATAL (OUTPATIENT)
Dept: OBSTETRICS AND GYNECOLOGY | Facility: CLINIC | Age: 23
End: 2018-04-25
Payer: COMMERCIAL

## 2018-04-25 VITALS
DIASTOLIC BLOOD PRESSURE: 64 MMHG | WEIGHT: 204.13 LBS | SYSTOLIC BLOOD PRESSURE: 110 MMHG | BODY MASS INDEX: 36.16 KG/M2

## 2018-04-25 DIAGNOSIS — O40.3XX0 POLYHYDRAMNIOS IN THIRD TRIMESTER COMPLICATION, SINGLE OR UNSPECIFIED FETUS: ICD-10-CM

## 2018-04-25 DIAGNOSIS — O40.3XX0 POLYHYDRAMNIOS IN THIRD TRIMESTER COMPLICATION, SINGLE OR UNSPECIFIED FETUS: Primary | ICD-10-CM

## 2018-04-25 DIAGNOSIS — Z3A.38 38 WEEKS GESTATION OF PREGNANCY: ICD-10-CM

## 2018-04-25 PROCEDURE — 76819 FETAL BIOPHYS PROFIL W/O NST: CPT | Mod: S$GLB,,, | Performed by: OBSTETRICS & GYNECOLOGY

## 2018-04-25 PROCEDURE — 99212 OFFICE O/P EST SF 10 MIN: CPT | Mod: TH,S$GLB,, | Performed by: MIDWIFE

## 2018-04-25 PROCEDURE — 99999 PR PBB SHADOW E&M-EST. PATIENT-LVL II: CPT | Mod: PBBFAC,,, | Performed by: MIDWIFE

## 2018-04-25 NOTE — PROGRESS NOTES
Doing well, good fm, ready for baby, BPP 8/8, MVP 8.1, RICHARD 20.6, poly improved, reviewed when to go to hospital, cervix ft/thk, pt anxious about who will be delivering, reassured pt, BPP at [facility], rtc 1 wk

## 2018-05-03 ENCOUNTER — ROUTINE PRENATAL (OUTPATIENT)
Dept: OBSTETRICS AND GYNECOLOGY | Facility: CLINIC | Age: 23
End: 2018-05-03
Payer: COMMERCIAL

## 2018-05-03 ENCOUNTER — PROCEDURE VISIT (OUTPATIENT)
Dept: OBSTETRICS AND GYNECOLOGY | Facility: CLINIC | Age: 23
End: 2018-05-03
Payer: COMMERCIAL

## 2018-05-03 VITALS — DIASTOLIC BLOOD PRESSURE: 82 MMHG | SYSTOLIC BLOOD PRESSURE: 120 MMHG | BODY MASS INDEX: 35.78 KG/M2 | WEIGHT: 202 LBS

## 2018-05-03 DIAGNOSIS — O40.3XX0 POLYHYDRAMNIOS IN THIRD TRIMESTER COMPLICATION, SINGLE OR UNSPECIFIED FETUS: ICD-10-CM

## 2018-05-03 DIAGNOSIS — O40.3XX0 POLYHYDRAMNIOS IN THIRD TRIMESTER COMPLICATION, SINGLE OR UNSPECIFIED FETUS: Primary | ICD-10-CM

## 2018-05-03 PROBLEM — O99.213 OBESITY DURING PREGNANCY IN THIRD TRIMESTER: Status: RESOLVED | Noted: 2018-03-06 | Resolved: 2018-05-03

## 2018-05-03 PROCEDURE — 0502F SUBSEQUENT PRENATAL CARE: CPT | Mod: S$GLB,,, | Performed by: ADVANCED PRACTICE MIDWIFE

## 2018-05-03 PROCEDURE — 76819 FETAL BIOPHYS PROFIL W/O NST: CPT | Mod: S$GLB,,, | Performed by: OBSTETRICS & GYNECOLOGY

## 2018-05-03 PROCEDURE — 99999 PR PBB SHADOW E&M-EST. PATIENT-LVL II: CPT | Mod: PBBFAC,,, | Performed by: ADVANCED PRACTICE MIDWIFE

## 2018-05-03 PROCEDURE — 76816 OB US FOLLOW-UP PER FETUS: CPT | Mod: S$GLB,,, | Performed by: OBSTETRICS & GYNECOLOGY

## 2018-05-03 NOTE — PROGRESS NOTES
Doing well, ready for baby   VE per pt request   Discussed IOL at 41 weeks gestation, reviewed prostaglandins including cytotec and cervidil, also reviewed R&B of pitocin IOL, will schedule at [facility]  Ultrasound today with cephalic presentation, posterior placenta, MVP 8.3cm, RICHARD 23.9cm, EFW 80%, BPP 8/8, polyhydraminos, reviewed with pt and FOB  Labor precautions and FKCs reviewed, when to go to hospital   The skin of the suprapubic region was evaluated and appears clean, dry and intact.  Counseled the patient to shower daily and to wash this area with an antibacterial soap such as Dial daily.  Advised her to not shave the hair from this area from now until after delivery.  I also counseled the patient to place antibacterial hand soap in all her bathrooms and kitchen at home to help facilitate proper hand hygiene practices before and after delivery.

## 2018-05-08 ENCOUNTER — ROUTINE PRENATAL (OUTPATIENT)
Dept: OBSTETRICS AND GYNECOLOGY | Facility: CLINIC | Age: 23
End: 2018-05-08
Payer: COMMERCIAL

## 2018-05-08 VITALS
WEIGHT: 202.81 LBS | SYSTOLIC BLOOD PRESSURE: 110 MMHG | DIASTOLIC BLOOD PRESSURE: 68 MMHG | BODY MASS INDEX: 35.93 KG/M2

## 2018-05-08 DIAGNOSIS — Z3A.40 40 WEEKS GESTATION OF PREGNANCY: ICD-10-CM

## 2018-05-08 DIAGNOSIS — O48.0 POST-TERM PREGNANCY, 40-42 WEEKS OF GESTATION: Primary | ICD-10-CM

## 2018-05-08 DIAGNOSIS — O48.0 POST-DATES PREGNANCY: ICD-10-CM

## 2018-05-08 PROCEDURE — 99212 OFFICE O/P EST SF 10 MIN: CPT | Mod: S$GLB,,, | Performed by: MIDWIFE

## 2018-05-08 PROCEDURE — 99999 PR PBB SHADOW E&M-EST. PATIENT-LVL II: CPT | Mod: PBBFAC,,, | Performed by: MIDWIFE

## 2018-05-08 RX ORDER — TERBUTALINE SULFATE 1 MG/ML
0.25 INJECTION SUBCUTANEOUS
Status: CANCELLED | OUTPATIENT
Start: 2018-05-08

## 2018-05-08 RX ORDER — ONDANSETRON 4 MG/1
8 TABLET, ORALLY DISINTEGRATING ORAL EVERY 8 HOURS PRN
Status: CANCELLED | OUTPATIENT
Start: 2018-05-08

## 2018-05-08 RX ORDER — MISOPROSTOL 100 MCG
25 TABLET ORAL EVERY 4 HOURS
Status: CANCELLED | OUTPATIENT
Start: 2018-05-08 | End: 2018-05-09

## 2018-05-08 RX ORDER — SODIUM CHLORIDE 9 MG/ML
INJECTION, SOLUTION INTRAVENOUS
Status: CANCELLED | OUTPATIENT
Start: 2018-05-08

## 2018-05-08 RX ORDER — SODIUM CHLORIDE, SODIUM LACTATE, POTASSIUM CHLORIDE, CALCIUM CHLORIDE 600; 310; 30; 20 MG/100ML; MG/100ML; MG/100ML; MG/100ML
INJECTION, SOLUTION INTRAVENOUS CONTINUOUS
Status: CANCELLED | OUTPATIENT
Start: 2018-05-08

## 2018-05-08 NOTE — PROGRESS NOTES
Doing well, good fm, ready for baby, cervix unchanged, induction scheduled for next Tuesday at [facility], orders placed, discussed induction process,  wants to know why we can't just do it now, discussed at his point waiting is beneficial to allow spont labor, voiced understanding, rtc PP

## 2018-05-13 ENCOUNTER — HOSPITAL ENCOUNTER (INPATIENT)
Facility: HOSPITAL | Age: 23
LOS: 4 days | Discharge: HOME | End: 2018-05-17
Attending: OBSTETRICS & GYNECOLOGY | Admitting: OBSTETRICS & GYNECOLOGY
Payer: COMMERCIAL

## 2018-05-13 DIAGNOSIS — O48.0 POST-TERM PREGNANCY, 40-42 WEEKS OF GESTATION: ICD-10-CM

## 2018-05-13 DIAGNOSIS — O48.0 POST-DATES PREGNANCY: ICD-10-CM

## 2018-05-13 DIAGNOSIS — O40.3XX0 POLYHYDRAMNIOS IN THIRD TRIMESTER: ICD-10-CM

## 2018-05-13 DIAGNOSIS — Z98.891 S/P CESAREAN SECTION: Primary | ICD-10-CM

## 2018-05-13 LAB
ABO + RH BLD: NORMAL
BASOPHILS # BLD AUTO: 0.01 K/UL
BASOPHILS NFR BLD: 0.1 %
BLD GP AB SCN CELLS X3 SERPL QL: NORMAL
DIFFERENTIAL METHOD: ABNORMAL
EOSINOPHIL # BLD AUTO: 0.1 K/UL
EOSINOPHIL NFR BLD: 0.5 %
ERYTHROCYTE [DISTWIDTH] IN BLOOD BY AUTOMATED COUNT: 13.7 %
HCT VFR BLD AUTO: 31.9 %
HGB BLD-MCNC: 10.6 G/DL
HIV1+2 IGG SERPL QL IA.RAPID: NEGATIVE
LYMPHOCYTES # BLD AUTO: 2.3 K/UL
LYMPHOCYTES NFR BLD: 21.4 %
MCH RBC QN AUTO: 28.3 PG
MCHC RBC AUTO-ENTMCNC: 33.2 G/DL
MCV RBC AUTO: 85 FL
MONOCYTES # BLD AUTO: 0.6 K/UL
MONOCYTES NFR BLD: 6 %
NEUTROPHILS # BLD AUTO: 7.6 K/UL
NEUTROPHILS NFR BLD: 72 %
PLATELET # BLD AUTO: 262 K/UL
PMV BLD AUTO: 10 FL
RBC # BLD AUTO: 3.74 M/UL
RPR SER QL: NORMAL
WBC # BLD AUTO: 10.5 K/UL

## 2018-05-13 PROCEDURE — 86592 SYPHILIS TEST NON-TREP QUAL: CPT

## 2018-05-13 PROCEDURE — 72100002 HC LABOR CARE, 1ST 8 HOURS

## 2018-05-13 PROCEDURE — 25000003 PHARM REV CODE 250: Performed by: ADVANCED PRACTICE MIDWIFE

## 2018-05-13 PROCEDURE — 85025 COMPLETE CBC W/AUTO DIFF WBC: CPT

## 2018-05-13 PROCEDURE — 86703 HIV-1/HIV-2 1 RESULT ANTBDY: CPT

## 2018-05-13 PROCEDURE — 86901 BLOOD TYPING SEROLOGIC RH(D): CPT

## 2018-05-13 PROCEDURE — 11000001 HC ACUTE MED/SURG PRIVATE ROOM

## 2018-05-13 RX ORDER — FLUOXETINE HYDROCHLORIDE 20 MG/1
20 CAPSULE ORAL DAILY
Status: DISCONTINUED | OUTPATIENT
Start: 2018-05-14 | End: 2018-05-13

## 2018-05-13 RX ORDER — FLUOXETINE 10 MG/1
20 CAPSULE ORAL NIGHTLY
Status: DISCONTINUED | OUTPATIENT
Start: 2018-05-13 | End: 2018-05-16

## 2018-05-13 RX ORDER — ONDANSETRON 8 MG/1
8 TABLET, ORALLY DISINTEGRATING ORAL EVERY 8 HOURS PRN
Status: DISCONTINUED | OUTPATIENT
Start: 2018-05-13 | End: 2018-05-15 | Stop reason: SDUPTHER

## 2018-05-13 RX ORDER — BUTORPHANOL TARTRATE 2 MG/ML
1 INJECTION INTRAMUSCULAR; INTRAVENOUS
Status: DISCONTINUED | OUTPATIENT
Start: 2018-05-13 | End: 2018-05-16

## 2018-05-13 RX ORDER — MISOPROSTOL 200 UG/1
600 TABLET ORAL
Status: DISCONTINUED | OUTPATIENT
Start: 2018-05-13 | End: 2018-05-16

## 2018-05-13 RX ORDER — FAMOTIDINE 20 MG/1
20 TABLET, FILM COATED ORAL 2 TIMES DAILY
Status: DISCONTINUED | OUTPATIENT
Start: 2018-05-13 | End: 2018-05-16

## 2018-05-13 RX ORDER — SODIUM CHLORIDE, SODIUM LACTATE, POTASSIUM CHLORIDE, CALCIUM CHLORIDE 600; 310; 30; 20 MG/100ML; MG/100ML; MG/100ML; MG/100ML
INJECTION, SOLUTION INTRAVENOUS CONTINUOUS
Status: DISCONTINUED | OUTPATIENT
Start: 2018-05-13 | End: 2018-05-15 | Stop reason: SDUPTHER

## 2018-05-13 RX ORDER — BUTORPHANOL TARTRATE 2 MG/ML
2 INJECTION INTRAMUSCULAR; INTRAVENOUS
Status: DISCONTINUED | OUTPATIENT
Start: 2018-05-13 | End: 2018-05-15

## 2018-05-13 RX ADMIN — FAMOTIDINE 20 MG: 20 TABLET ORAL at 08:05

## 2018-05-13 RX ADMIN — DINOPROSTONE 10 MG: 10 INSERT VAGINAL at 08:05

## 2018-05-13 RX ADMIN — FLUOXETINE 20 MG: 20 CAPSULE ORAL at 08:05

## 2018-05-13 NOTE — ASSESSMENT & PLAN NOTE
Admit to L&D  Cervidil IOL, R&B discussed with pt and family x 2, wish to proceed   Continue to monitor maternal and fetal status

## 2018-05-13 NOTE — SUBJECTIVE & OBJECTIVE
Obstetric HPI:  Patient reports Intensity: moderate contractions, active fetal movement, No vaginal bleeding , No loss of fluid     This pregnancy has been complicated by   Chlamydia +, ARMAND negative   Depression, on prozac     Obstetric History       T0      L0     SAB2   TAB0   Ectopic0   Multiple0   Live Births0       # Outcome Date GA Lbr Boyd/2nd Weight Sex Delivery Anes PTL Lv   3 Current            2 SAB            1 SAB                 No past medical history on file.  Past Surgical History:   Procedure Laterality Date    DILATION AND CURETTAGE OF UTERUS         PTA Medications   Medication Sig    FLUoxetine (PROZAC) 20 MG capsule Take 1 capsule (20 mg total) by mouth once daily.    PNV NO.5/IRON ASP GLY/DOCUS/FA (PRENATAL VIT #5-IRON-FA-DSS ORAL) Take 1 tablet by mouth once daily.    promethazine (PHENERGAN) 25 MG tablet Take 1 tablet (25 mg total) by mouth every 6 (six) hours as needed for Nausea.       Review of patient's allergies indicates:  No Known Allergies     Family History     Problem Relation (Age of Onset)    Stroke Father        Social History Main Topics    Smoking status: Former Smoker    Smokeless tobacco: Never Used    Alcohol use No    Drug use: No    Sexual activity: Yes     Partners: Male     Review of Systems   Gastrointestinal: Positive for abdominal pain.   All other systems reviewed and are negative.     Objective:     Vital Signs (Most Recent):  Pulse: 77 (18 1810)  BP: 135/72 (18 1810) Vital Signs (24h Range):  Pulse:  [77] 77  BP: (135)/(72) 135/72     Weight: 92 kg (202 lb 13.2 oz)  Body mass index is 35.93 kg/m².    FHT: 145 Cat 1 (reassuring)  TOCO:  Q 8-10 minutes    Physical Exam:   Constitutional: She is oriented to person, place, and time. Vital signs are normal. She appears well-developed and well-nourished. She is cooperative.    HENT:   Head: Normocephalic and atraumatic.     Neck: Normal range of motion.    Cardiovascular: Normal rate,  regular rhythm and normal heart sounds.     Pulmonary/Chest: Effort normal and breath sounds normal.        Abdominal: Soft.   Gravid, non-tender     Genitourinary: Vagina normal and uterus normal. Pelvic exam was performed with patient supine. Cervix is normal. Labial bartholins normal.          Musculoskeletal: Normal range of motion and moves all extremeties.       Neurological: She is alert and oriented to person, place, and time. She has normal strength.    Skin: Skin is warm, dry and intact. Capillary refill takes less than 2 seconds.    Psychiatric: She has a normal mood and affect. Her speech is normal and behavior is normal. Judgment and thought content normal. Cognition and memory are normal.       Cervix:  Dilation:  0  Effacement:  25%  Station: -3  Presentation: Vertex     Significant Labs:  Lab Results   Component Value Date    GROUPTRH O POS 09/19/2017    HEPBSAG Negative 09/19/2017    STREPBCULT No Group B Streptococcus isolated 04/11/2018       I have personallly reviewed all pertinent lab results from the last 24 hours.

## 2018-05-13 NOTE — HOSPITAL COURSE
Admit to L&D  Cervidil IOL  Begin cytotec 50mcg po  1900 Cytotec vaginally 25 mcg, leaking clear fluid nitrazine negative  2035 requesting epidural  5/15/18 -AROM Pitocin at 4mu

## 2018-05-13 NOTE — H&P
[facility] -   Obstetrics  History & Physical    Patient Name: Ghazala Mera  MRN: [id number]  Admission Date: 2018  Primary Care Provider: Halle Srivastava MD    Subjective:     Principal Problem:Polyhydramnios in third trimester    History of Present Illness:  Pt here for contractions and pressure    Obstetric HPI:  Patient reports Intensity: moderate contractions, active fetal movement, No vaginal bleeding , No loss of fluid     This pregnancy has been complicated by   Chlamydia +, ARMAND negative   Depression, on prozac     Obstetric History       T0      L0     SAB2   TAB0   Ectopic0   Multiple0   Live Births0       # Outcome Date GA Lbr Boyd/2nd Weight Sex Delivery Anes PTL Lv   3 Current            2 SAB            1 SAB                 No past medical history on file.  Past Surgical History:   Procedure Laterality Date    DILATION AND CURETTAGE OF UTERUS         PTA Medications   Medication Sig    FLUoxetine (PROZAC) 20 MG capsule Take 1 capsule (20 mg total) by mouth once daily.    PNV NO.5/IRON ASP GLY/DOCUS/FA (PRENATAL VIT #5-IRON-FA-DSS ORAL) Take 1 tablet by mouth once daily.    promethazine (PHENERGAN) 25 MG tablet Take 1 tablet (25 mg total) by mouth every 6 (six) hours as needed for Nausea.       Review of patient's allergies indicates:  No Known Allergies     Family History     Problem Relation (Age of Onset)    Stroke Father        Social History Main Topics    Smoking status: Former Smoker    Smokeless tobacco: Never Used    Alcohol use No    Drug use: No    Sexual activity: Yes     Partners: Male     Review of Systems   Gastrointestinal: Positive for abdominal pain.   All other systems reviewed and are negative.     Objective:     Vital Signs (Most Recent):  Pulse: 77 (18 1810)  BP: 135/72 (18 1810) Vital Signs (24h Range):  Pulse:  [77] 77  BP: (135)/(72) 135/72     Weight: 92 kg (202 lb 13.2 oz)  Body mass index is 35.93 kg/m².    FHT: 145  Cat 1 (reassuring)  TOCO:  Q 8-10 minutes    Physical Exam:   Constitutional: She is oriented to person, place, and time. Vital signs are normal. She appears well-developed and well-nourished. She is cooperative.    HENT:   Head: Normocephalic and atraumatic.     Neck: Normal range of motion.    Cardiovascular: Normal rate, regular rhythm and normal heart sounds.     Pulmonary/Chest: Effort normal and breath sounds normal.        Abdominal: Soft.   Gravid, non-tender     Genitourinary: Vagina normal and uterus normal. Pelvic exam was performed with patient supine. Cervix is normal. Labial bartholins normal.          Musculoskeletal: Normal range of motion and moves all extremeties.       Neurological: She is alert and oriented to person, place, and time. She has normal strength.    Skin: Skin is warm, dry and intact. Capillary refill takes less than 2 seconds.    Psychiatric: She has a normal mood and affect. Her speech is normal and behavior is normal. Judgment and thought content normal. Cognition and memory are normal.       Cervix:  Dilation:  0  Effacement:  25%  Station: -3  Presentation: Vertex     Significant Labs:  Lab Results   Component Value Date    GROUPTRH O POS 2017    HEPBSAG Negative 2017    STREPBCULT No Group B Streptococcus isolated 2018       I have personallly reviewed all pertinent lab results from the last 24 hours.    Assessment/Plan:     22 y.o. female  at 40w5d for:    Post-term pregnancy, 40-42 weeks of gestation    Admit to L&D  Cervidil IOL, R&B discussed with pt and family x 2, wish to proceed   Continue to monitor maternal and fetal status        History of depression    Prozac Daily            Chato Leung CNM  Obstetrics  [facility]

## 2018-05-14 ENCOUNTER — ANESTHESIA (OUTPATIENT)
Dept: OBSTETRICS AND GYNECOLOGY | Facility: HOSPITAL | Age: 23
End: 2018-05-14
Payer: COMMERCIAL

## 2018-05-14 ENCOUNTER — ANESTHESIA EVENT (OUTPATIENT)
Dept: OBSTETRICS AND GYNECOLOGY | Facility: HOSPITAL | Age: 23
End: 2018-05-14
Payer: COMMERCIAL

## 2018-05-14 PROCEDURE — 3E0P7VZ INTRODUCTION OF HORMONE INTO FEMALE REPRODUCTIVE, VIA NATURAL OR ARTIFICIAL OPENING: ICD-10-PCS | Performed by: OBSTETRICS & GYNECOLOGY

## 2018-05-14 PROCEDURE — 63600175 PHARM REV CODE 636 W HCPCS: Performed by: ADVANCED PRACTICE MIDWIFE

## 2018-05-14 PROCEDURE — 25000003 PHARM REV CODE 250: Performed by: NURSE ANESTHETIST, CERTIFIED REGISTERED

## 2018-05-14 PROCEDURE — 72100003 HC LABOR CARE, EA. ADDL. 8 HRS

## 2018-05-14 PROCEDURE — 25000003 PHARM REV CODE 250: Performed by: ADVANCED PRACTICE MIDWIFE

## 2018-05-14 PROCEDURE — 62326 NJX INTERLAMINAR LMBR/SAC: CPT | Performed by: NURSE ANESTHETIST, CERTIFIED REGISTERED

## 2018-05-14 PROCEDURE — 27800517 HC TRAY,EPIDURAL-CONTINUOUS: Performed by: NURSE ANESTHETIST, CERTIFIED REGISTERED

## 2018-05-14 PROCEDURE — 11000001 HC ACUTE MED/SURG PRIVATE ROOM

## 2018-05-14 PROCEDURE — 63600175 PHARM REV CODE 636 W HCPCS: Performed by: NURSE ANESTHETIST, CERTIFIED REGISTERED

## 2018-05-14 PROCEDURE — 51701 INSERT BLADDER CATHETER: CPT

## 2018-05-14 PROCEDURE — 72100002 HC LABOR CARE, 1ST 8 HOURS

## 2018-05-14 RX ORDER — FAMOTIDINE 10 MG/ML
20 INJECTION INTRAVENOUS ONCE
Status: DISCONTINUED | OUTPATIENT
Start: 2018-05-14 | End: 2018-05-16

## 2018-05-14 RX ORDER — ROPIVACAINE HYDROCHLORIDE 2 MG/ML
INJECTION, SOLUTION EPIDURAL; INFILTRATION; PERINEURAL CONTINUOUS PRN
Status: DISCONTINUED | OUTPATIENT
Start: 2018-05-14 | End: 2018-05-15

## 2018-05-14 RX ORDER — MISOPROSTOL 100 MCG
25 TABLET ORAL EVERY 6 HOURS
Status: DISCONTINUED | OUTPATIENT
Start: 2018-05-14 | End: 2018-05-15 | Stop reason: SDUPTHER

## 2018-05-14 RX ORDER — ROPIVACAINE HYDROCHLORIDE 2 MG/ML
INJECTION, SOLUTION EPIDURAL; INFILTRATION CONTINUOUS
Status: DISCONTINUED | OUTPATIENT
Start: 2018-05-14 | End: 2018-05-15

## 2018-05-14 RX ORDER — ROPIVACAINE HYDROCHLORIDE 2 MG/ML
INJECTION, SOLUTION EPIDURAL; INFILTRATION; PERINEURAL
Status: DISCONTINUED | OUTPATIENT
Start: 2018-05-14 | End: 2018-05-15

## 2018-05-14 RX ORDER — SODIUM CITRATE AND CITRIC ACID MONOHYDRATE 334; 500 MG/5ML; MG/5ML
30 SOLUTION ORAL ONCE
Status: DISCONTINUED | OUTPATIENT
Start: 2018-05-14 | End: 2018-05-16

## 2018-05-14 RX ORDER — MISOPROSTOL 100 MCG
50 TABLET ORAL EVERY 4 HOURS
Status: DISCONTINUED | OUTPATIENT
Start: 2018-05-14 | End: 2018-05-15 | Stop reason: SDUPTHER

## 2018-05-14 RX ORDER — LIDOCAINE HYDROCHLORIDE AND EPINEPHRINE 15; 5 MG/ML; UG/ML
INJECTION, SOLUTION EPIDURAL
Status: DISCONTINUED | OUTPATIENT
Start: 2018-05-14 | End: 2018-05-15

## 2018-05-14 RX ORDER — OXYTOCIN/RINGER'S LACTATE 20/1000 ML
2 PLASTIC BAG, INJECTION (ML) INTRAVENOUS CONTINUOUS
Status: DISCONTINUED | OUTPATIENT
Start: 2018-05-14 | End: 2018-05-16

## 2018-05-14 RX ADMIN — ROPIVACAINE HYDROCHLORIDE 12 ML: 2 INJECTION, SOLUTION EPIDURAL; INFILTRATION at 09:05

## 2018-05-14 RX ADMIN — FLUOXETINE 20 MG: 20 CAPSULE ORAL at 10:05

## 2018-05-14 RX ADMIN — BUTORPHANOL TARTRATE 2 MG: 2 INJECTION, SOLUTION INTRAMUSCULAR; INTRAVENOUS at 06:05

## 2018-05-14 RX ADMIN — Medication 25 MCG: at 03:05

## 2018-05-14 RX ADMIN — Medication 50 MCG: at 10:05

## 2018-05-14 RX ADMIN — LIDOCAINE HYDROCHLORIDE,EPINEPHRINE BITARTRATE 3 ML: 15; .005 INJECTION, SOLUTION EPIDURAL; INFILTRATION; INTRACAUDAL; PERINEURAL at 09:05

## 2018-05-14 RX ADMIN — BUTORPHANOL TARTRATE 2 MG: 2 INJECTION, SOLUTION INTRAMUSCULAR; INTRAVENOUS at 02:05

## 2018-05-14 RX ADMIN — Medication 2 MILLI-UNITS/MIN: at 11:05

## 2018-05-14 RX ADMIN — Medication 25 MCG: at 07:05

## 2018-05-14 RX ADMIN — SODIUM CHLORIDE, SODIUM LACTATE, POTASSIUM CHLORIDE, AND CALCIUM CHLORIDE: .6; .31; .03; .02 INJECTION, SOLUTION INTRAVENOUS at 10:05

## 2018-05-14 RX ADMIN — SODIUM CHLORIDE, SODIUM LACTATE, POTASSIUM CHLORIDE, AND CALCIUM CHLORIDE 500 ML/HR: .6; .31; .03; .02 INJECTION, SOLUTION INTRAVENOUS at 08:05

## 2018-05-14 RX ADMIN — ROPIVACAINE HYDROCHLORIDE 12 ML/HR: 2 INJECTION, SOLUTION EPIDURAL; INFILTRATION at 09:05

## 2018-05-14 RX ADMIN — SODIUM CHLORIDE, SODIUM LACTATE, POTASSIUM CHLORIDE, AND CALCIUM CHLORIDE 1000 ML: .6; .31; .03; .02 INJECTION, SOLUTION INTRAVENOUS at 09:05

## 2018-05-14 NOTE — PROGRESS NOTES
[facility]  Obstetrics  Labor Progress Note    Patient Name: Ghazala Mera  MRN: [id number]  Admission Date: 2018  Hospital Length of Stay: 1 days  Attending Physician: Alyssa Moe MD  Primary Care Provider: Halle Srivastava MD    Subjective:     Principal Problem:Polyhydramnios in third trimester    Hospital Course:  Admit to L&D  Cervidil IOL  Begin cytotec 50mcg po    Interval History:  Ghazala is a 22 y.o.  at 40w6d. She is doing well.     Objective:     Vital Signs (Most Recent):  Temp: 98.2 °F (36.8 °C) (18 0713)  Pulse: 93 (18 0810)  Resp: 18 (18 0713)  BP: 127/80 (18 0810) Vital Signs (24h Range):  Temp:  [98.2 °F (36.8 °C)-98.7 °F (37.1 °C)] 98.2 °F (36.8 °C)  Pulse:  [74-93] 93  Resp:  [18] 18  BP: (116-135)/(61-80) 127/80     Weight: 92 kg (202 lb 13.2 oz)  Body mass index is 35.93 kg/m².    FHT: 150Cat 1 (reassuring)  TOCO:  Q 2-3 minutes    Cervical Exam:  Dilation:  0  Effacement:  50%  Station: -3  Presentation: Vertex     Significant Labs:  Lab Results   Component Value Date    GROUPTRH O POS 2018    HEPBSAG Negative 2017    STREPBCULT No Group B Streptococcus isolated 2018       I have personallly reviewed all pertinent lab results from the last 24 hours.    Physical Exam:   Constitutional: She is oriented to person, place, and time. She appears well-developed and well-nourished.     Eyes: Conjunctivae are normal. Pupils are equal, round, and reactive to light.    Neck: Normal range of motion.    Cardiovascular: Normal rate and regular rhythm.     Pulmonary/Chest: Breath sounds normal.        Abdominal: Soft.     Genitourinary: Vagina normal and uterus normal.           Musculoskeletal: Normal range of motion and moves all extremeties.       Neurological: She is alert and oriented to person, place, and time.    Skin: Skin is warm.    Psychiatric: She has a normal mood and affect. Her behavior is normal. Thought content  normal.       Assessment/Plan:     22 y.o. female  at 40w6d for:    Post-term pregnancy, 40-42 weeks of gestation    Admit to L&D  Cervidil IOL, R&B discussed with pt and family x 2, wish to proceed   Continue to monitor maternal and fetal status  cervidil removed  Begin cytotec induction        History of depression    Prozac Daily              Malaika Escoto CNM  Obstetrics  [facility]

## 2018-05-14 NOTE — ASSESSMENT & PLAN NOTE
Admit to L&D  Cervidil IOL, R&B discussed with pt and family x 2, wish to proceed   Continue to monitor maternal and fetal status  cervidil removed  Begin cytotec induction

## 2018-05-14 NOTE — SUBJECTIVE & OBJECTIVE
Interval History:  Ghazala is a 22 y.o.  at 40w6d. She is doing well.     Objective:     Vital Signs (Most Recent):  Temp: 98.2 °F (36.8 °C) (18 0713)  Pulse: 93 (18 0810)  Resp: 18 (18 0713)  BP: 127/80 (18 0810) Vital Signs (24h Range):  Temp:  [98.2 °F (36.8 °C)-98.7 °F (37.1 °C)] 98.2 °F (36.8 °C)  Pulse:  [74-93] 93  Resp:  [18] 18  BP: (116-135)/(61-80) 127/80     Weight: 92 kg (202 lb 13.2 oz)  Body mass index is 35.93 kg/m².    FHT: 150Cat 1 (reassuring)  TOCO:  Q 2-3 minutes    Cervical Exam:  Dilation:  0  Effacement:  50%  Station: -3  Presentation: Vertex     Significant Labs:  Lab Results   Component Value Date    GROUPTRH O POS 2018    HEPBSAG Negative 2017    STREPBCULT No Group B Streptococcus isolated 2018       I have personallly reviewed all pertinent lab results from the last 24 hours.    Physical Exam:   Constitutional: She is oriented to person, place, and time. She appears well-developed and well-nourished.     Eyes: Conjunctivae are normal. Pupils are equal, round, and reactive to light.    Neck: Normal range of motion.    Cardiovascular: Normal rate and regular rhythm.     Pulmonary/Chest: Breath sounds normal.        Abdominal: Soft.     Genitourinary: Vagina normal and uterus normal.           Musculoskeletal: Normal range of motion and moves all extremeties.       Neurological: She is alert and oriented to person, place, and time.    Skin: Skin is warm.    Psychiatric: She has a normal mood and affect. Her behavior is normal. Thought content normal.

## 2018-05-14 NOTE — PROGRESS NOTES
S: Doing well with  at bedside, feeling cramps    O:   VSS/AF  FHTs: 125, cat 1, reassuring  TOCO: q4-8min    A:   Cervidil IOL  Post dates pregnancy     P:   Continue to monitor maternal and fetal status  Cervidil to be pulled at 0800

## 2018-05-14 NOTE — ASSESSMENT & PLAN NOTE
Admit to L&D  Cervidil IOL, R&B discussed with pt and family x 2, wish to proceed   Continue to monitor maternal and fetal status  cervidil removed  Begin cytotec induction  5/14/18 1230 Comfortable  Cytotec given  FHT's reassuring  P continue induction

## 2018-05-14 NOTE — SUBJECTIVE & OBJECTIVE
Interval History:  Ghazala is a 22 y.o.  at 40w6d. She is doing well.     Objective:     Vital Signs (Most Recent):  Temp: 98.2 °F (36.8 °C) (18 0713)  Pulse: 69 (18 1230)  Resp: 18 (18 0713)  BP: (!) 102/54 (18 1230) Vital Signs (24h Range):  Temp:  [98.2 °F (36.8 °C)-98.7 °F (37.1 °C)] 98.2 °F (36.8 °C)  Pulse:  [66-93] 69  Resp:  [18] 18  BP: ()/(54-80) 102/54     Weight: 92 kg (202 lb 13.2 oz)  Body mass index is 35.93 kg/m².    FHT: 130Cat 1 (reassuring)  TOCO:  Q 2-4 minutes    Cervical Exam:  Dilation:    Effacement:    Station:   Presentation:      Significant Labs:  Lab Results   Component Value Date    GROUPTRH O POS 2018    HEPBSAG Negative 2017    STREPBCULT No Group B Streptococcus isolated 2018       I have personallly reviewed all pertinent lab results from the last 24 hours.    Physical Exam

## 2018-05-14 NOTE — PROGRESS NOTES
[facility]  Obstetrics  Labor Progress Note    Patient Name: Ghazala Mera  MRN: [id number]  Admission Date: 2018  Hospital Length of Stay: 1 days  Attending Physician: Alyssa Moe MD  Primary Care Provider: Halle Srivastava MD    Subjective:     Principal Problem:Polyhydramnios in third trimester    Hospital Course:  Admit to L&D  Cervidil IOL  Begin cytotec 50mcg po    Interval History:  Ghazala is a 22 y.o.  at 40w6d. She is doing well.     Objective:     Vital Signs (Most Recent):  Temp: 98.2 °F (36.8 °C) (18 0713)  Pulse: 69 (18 1230)  Resp: 18 (18 0713)  BP: (!) 102/54 (18 1230) Vital Signs (24h Range):  Temp:  [98.2 °F (36.8 °C)-98.7 °F (37.1 °C)] 98.2 °F (36.8 °C)  Pulse:  [66-93] 69  Resp:  [18] 18  BP: ()/(54-80) 102/54     Weight: 92 kg (202 lb 13.2 oz)  Body mass index is 35.93 kg/m².    FHT: 130Cat 1 (reassuring)  TOCO:  Q 2-4 minutes    Cervical Exam:  Dilation:    Effacement:    Station:   Presentation:      Significant Labs:  Lab Results   Component Value Date    GROUPTRH O POS 2018    HEPBSAG Negative 2017    STREPBCULT No Group B Streptococcus isolated 2018       I have personallly reviewed all pertinent lab results from the last 24 hours.    Physical Exam    Assessment/Plan:     22 y.o. female  at 40w6d for:    Post-term pregnancy, 40-42 weeks of gestation    Admit to L&D  Cervidil IOL, R&B discussed with pt and family x 2, wish to proceed   Continue to monitor maternal and fetal status  cervidil removed  Begin cytotec induction  18 1230 Comfortable  Cytotec given  FHT's reassuring  P continue induction        History of depression    Prozac Daily            CORI Escoto CNM  Obstetrics  [facility]

## 2018-05-15 PROBLEM — O48.0 POST-DATES PREGNANCY: Status: ACTIVE | Noted: 2018-05-15

## 2018-05-15 PROBLEM — Z98.891 S/P CESAREAN SECTION: Status: ACTIVE | Noted: 2018-05-15

## 2018-05-15 PROBLEM — O09.291 HISTORY OF MISCARRIAGE, CURRENTLY PREGNANT, FIRST TRIMESTER: Status: RESOLVED | Noted: 2017-10-02 | Resolved: 2018-05-15

## 2018-05-15 PROBLEM — O48.0 POST-DATES PREGNANCY: Status: RESOLVED | Noted: 2018-05-15 | Resolved: 2018-05-15

## 2018-05-15 PROCEDURE — 63600175 PHARM REV CODE 636 W HCPCS: Performed by: ADVANCED PRACTICE MIDWIFE

## 2018-05-15 PROCEDURE — 25000003 PHARM REV CODE 250: Performed by: ADVANCED PRACTICE MIDWIFE

## 2018-05-15 PROCEDURE — 51702 INSERT TEMP BLADDER CATH: CPT

## 2018-05-15 PROCEDURE — 37000008 HC ANESTHESIA 1ST 15 MINUTES: Performed by: OBSTETRICS & GYNECOLOGY

## 2018-05-15 PROCEDURE — 59514 CESAREAN DELIVERY ONLY: CPT | Mod: AS,,, | Performed by: ADVANCED PRACTICE MIDWIFE

## 2018-05-15 PROCEDURE — 27000181 HC CABLE, IUPC

## 2018-05-15 PROCEDURE — 72100003 HC LABOR CARE, EA. ADDL. 8 HRS

## 2018-05-15 PROCEDURE — 59510 CESAREAN DELIVERY: CPT | Mod: ,,, | Performed by: OBSTETRICS & GYNECOLOGY

## 2018-05-15 PROCEDURE — 37000009 HC ANESTHESIA EA ADD 15 MINS: Performed by: OBSTETRICS & GYNECOLOGY

## 2018-05-15 PROCEDURE — 25000003 PHARM REV CODE 250: Performed by: NURSE ANESTHETIST, CERTIFIED REGISTERED

## 2018-05-15 PROCEDURE — 10907ZC DRAINAGE OF AMNIOTIC FLUID, THERAPEUTIC FROM PRODUCTS OF CONCEPTION, VIA NATURAL OR ARTIFICIAL OPENING: ICD-10-PCS | Performed by: OBSTETRICS & GYNECOLOGY

## 2018-05-15 PROCEDURE — 36000685 HC CESAREAN SECTION LEVEL I

## 2018-05-15 PROCEDURE — 25000003 PHARM REV CODE 250: Performed by: OBSTETRICS & GYNECOLOGY

## 2018-05-15 PROCEDURE — 11000001 HC ACUTE MED/SURG PRIVATE ROOM

## 2018-05-15 PROCEDURE — 63600175 PHARM REV CODE 636 W HCPCS: Performed by: NURSE ANESTHETIST, CERTIFIED REGISTERED

## 2018-05-15 PROCEDURE — 63600175 PHARM REV CODE 636 W HCPCS: Performed by: MIDWIFE

## 2018-05-15 PROCEDURE — 51701 INSERT BLADDER CATHETER: CPT

## 2018-05-15 RX ORDER — SODIUM CHLORIDE, SODIUM LACTATE, POTASSIUM CHLORIDE, CALCIUM CHLORIDE 600; 310; 30; 20 MG/100ML; MG/100ML; MG/100ML; MG/100ML
INJECTION, SOLUTION INTRAVENOUS CONTINUOUS
Status: DISCONTINUED | OUTPATIENT
Start: 2018-05-15 | End: 2018-05-15 | Stop reason: SDUPTHER

## 2018-05-15 RX ORDER — CEFAZOLIN SODIUM 1 G/3ML
2 INJECTION, POWDER, FOR SOLUTION INTRAMUSCULAR; INTRAVENOUS
Status: COMPLETED | OUTPATIENT
Start: 2018-05-15 | End: 2018-05-15

## 2018-05-15 RX ORDER — TERBUTALINE SULFATE 1 MG/ML
0.25 INJECTION SUBCUTANEOUS
Status: DISCONTINUED | OUTPATIENT
Start: 2018-05-15 | End: 2018-05-16

## 2018-05-15 RX ORDER — KETOROLAC TROMETHAMINE 30 MG/ML
INJECTION, SOLUTION INTRAMUSCULAR; INTRAVENOUS
Status: DISCONTINUED | OUTPATIENT
Start: 2018-05-15 | End: 2018-05-15

## 2018-05-15 RX ORDER — OXYCODONE AND ACETAMINOPHEN 5; 325 MG/1; MG/1
1 TABLET ORAL EVERY 4 HOURS PRN
Status: DISCONTINUED | OUTPATIENT
Start: 2018-05-15 | End: 2018-05-17 | Stop reason: HOSPADM

## 2018-05-15 RX ORDER — MISOPROSTOL 100 MCG
25 TABLET ORAL EVERY 4 HOURS
Status: DISCONTINUED | OUTPATIENT
Start: 2018-05-15 | End: 2018-05-15

## 2018-05-15 RX ORDER — OXYCODONE AND ACETAMINOPHEN 10; 325 MG/1; MG/1
1 TABLET ORAL EVERY 4 HOURS PRN
Status: DISCONTINUED | OUTPATIENT
Start: 2018-05-15 | End: 2018-05-17 | Stop reason: HOSPADM

## 2018-05-15 RX ORDER — SODIUM CHLORIDE 9 MG/ML
INJECTION, SOLUTION INTRAVENOUS
Status: DISCONTINUED | OUTPATIENT
Start: 2018-05-15 | End: 2018-05-16

## 2018-05-15 RX ORDER — FLUOXETINE HYDROCHLORIDE 20 MG/1
20 CAPSULE ORAL DAILY
Status: DISCONTINUED | OUTPATIENT
Start: 2018-05-16 | End: 2018-05-15 | Stop reason: SDUPTHER

## 2018-05-15 RX ORDER — ONDANSETRON HYDROCHLORIDE 2 MG/ML
INJECTION, SOLUTION INTRAMUSCULAR; INTRAVENOUS
Status: DISCONTINUED | OUTPATIENT
Start: 2018-05-15 | End: 2018-05-15

## 2018-05-15 RX ORDER — OXYTOCIN/RINGER'S LACTATE 20/1000 ML
41.65 PLASTIC BAG, INJECTION (ML) INTRAVENOUS CONTINUOUS
Status: DISPENSED | OUTPATIENT
Start: 2018-05-15 | End: 2018-05-16

## 2018-05-15 RX ORDER — MORPHINE SULFATE 1 MG/ML
INJECTION, SOLUTION EPIDURAL; INTRATHECAL; INTRAVENOUS
Status: DISCONTINUED | OUTPATIENT
Start: 2018-05-15 | End: 2018-05-15

## 2018-05-15 RX ORDER — OXYTOCIN 10 [USP'U]/ML
INJECTION, SOLUTION INTRAMUSCULAR; INTRAVENOUS
Status: DISCONTINUED | OUTPATIENT
Start: 2018-05-15 | End: 2018-05-15

## 2018-05-15 RX ORDER — IBUPROFEN 800 MG/1
800 TABLET ORAL EVERY 8 HOURS
Status: DISCONTINUED | OUTPATIENT
Start: 2018-05-17 | End: 2018-05-17 | Stop reason: HOSPADM

## 2018-05-15 RX ORDER — CHLORHEXIDINE GLUCONATE ORAL RINSE 1.2 MG/ML
10 SOLUTION DENTAL
Status: DISCONTINUED | OUTPATIENT
Start: 2018-05-15 | End: 2018-05-16

## 2018-05-15 RX ORDER — DIPHENHYDRAMINE HCL 25 MG
25 CAPSULE ORAL EVERY 4 HOURS PRN
Status: DISCONTINUED | OUTPATIENT
Start: 2018-05-15 | End: 2018-05-17 | Stop reason: HOSPADM

## 2018-05-15 RX ORDER — AMOXICILLIN 250 MG
1 CAPSULE ORAL NIGHTLY PRN
Status: DISCONTINUED | OUTPATIENT
Start: 2018-05-15 | End: 2018-05-17 | Stop reason: HOSPADM

## 2018-05-15 RX ORDER — SODIUM CHLORIDE, SODIUM LACTATE, POTASSIUM CHLORIDE, CALCIUM CHLORIDE 600; 310; 30; 20 MG/100ML; MG/100ML; MG/100ML; MG/100ML
INJECTION, SOLUTION INTRAVENOUS CONTINUOUS
Status: ACTIVE | OUTPATIENT
Start: 2018-05-15 | End: 2018-05-16

## 2018-05-15 RX ORDER — ONDANSETRON 8 MG/1
8 TABLET, ORALLY DISINTEGRATING ORAL EVERY 8 HOURS PRN
Status: DISCONTINUED | OUTPATIENT
Start: 2018-05-15 | End: 2018-05-16

## 2018-05-15 RX ORDER — HYDROCORTISONE 25 MG/G
CREAM TOPICAL 3 TIMES DAILY PRN
Status: DISCONTINUED | OUTPATIENT
Start: 2018-05-15 | End: 2018-05-17 | Stop reason: HOSPADM

## 2018-05-15 RX ORDER — FENTANYL CITRATE 50 UG/ML
INJECTION, SOLUTION INTRAMUSCULAR; INTRAVENOUS
Status: DISCONTINUED | OUTPATIENT
Start: 2018-05-15 | End: 2018-05-15

## 2018-05-15 RX ORDER — LIDOCAINE HCL/EPINEPHRINE/PF 2%-1:200K
VIAL (ML) INJECTION
Status: DISCONTINUED | OUTPATIENT
Start: 2018-05-15 | End: 2018-05-15

## 2018-05-15 RX ORDER — ADHESIVE BANDAGE
30 BANDAGE TOPICAL 2 TIMES DAILY PRN
Status: DISCONTINUED | OUTPATIENT
Start: 2018-05-16 | End: 2018-05-17 | Stop reason: HOSPADM

## 2018-05-15 RX ORDER — SODIUM CHLORIDE, SODIUM LACTATE, POTASSIUM CHLORIDE, CALCIUM CHLORIDE 600; 310; 30; 20 MG/100ML; MG/100ML; MG/100ML; MG/100ML
INJECTION, SOLUTION INTRAVENOUS CONTINUOUS PRN
Status: DISCONTINUED | OUTPATIENT
Start: 2018-05-15 | End: 2018-05-15

## 2018-05-15 RX ORDER — DOCUSATE SODIUM 100 MG/1
200 CAPSULE, LIQUID FILLED ORAL 2 TIMES DAILY
Status: DISCONTINUED | OUTPATIENT
Start: 2018-05-15 | End: 2018-05-17 | Stop reason: HOSPADM

## 2018-05-15 RX ORDER — KETOROLAC TROMETHAMINE 30 MG/ML
30 INJECTION, SOLUTION INTRAMUSCULAR; INTRAVENOUS EVERY 6 HOURS
Status: COMPLETED | OUTPATIENT
Start: 2018-05-16 | End: 2018-05-16

## 2018-05-15 RX ORDER — SUCCINYLCHOLINE CHLORIDE 20 MG/ML
INJECTION INTRAMUSCULAR; INTRAVENOUS
Status: DISCONTINUED | OUTPATIENT
Start: 2018-05-15 | End: 2018-05-15

## 2018-05-15 RX ORDER — PROPOFOL 10 MG/ML
VIAL (ML) INTRAVENOUS
Status: DISCONTINUED | OUTPATIENT
Start: 2018-05-15 | End: 2018-05-15

## 2018-05-15 RX ORDER — PHENYLEPHRINE HYDROCHLORIDE 10 MG/ML
INJECTION INTRAVENOUS
Status: DISCONTINUED | OUTPATIENT
Start: 2018-05-15 | End: 2018-05-15

## 2018-05-15 RX ORDER — LIDOCAINE HCL/PF 100 MG/5ML
SYRINGE (ML) INTRAVENOUS
Status: DISCONTINUED | OUTPATIENT
Start: 2018-05-15 | End: 2018-05-15

## 2018-05-15 RX ORDER — BISACODYL 10 MG
10 SUPPOSITORY, RECTAL RECTAL ONCE AS NEEDED
Status: ACTIVE | OUTPATIENT
Start: 2018-05-15 | End: 2018-05-15

## 2018-05-15 RX ORDER — SODIUM CITRATE AND CITRIC ACID MONOHYDRATE 334; 500 MG/5ML; MG/5ML
30 SOLUTION ORAL
Status: DISCONTINUED | OUTPATIENT
Start: 2018-05-15 | End: 2018-05-16

## 2018-05-15 RX ORDER — SIMETHICONE 80 MG
1 TABLET,CHEWABLE ORAL EVERY 6 HOURS PRN
Status: DISCONTINUED | OUTPATIENT
Start: 2018-05-15 | End: 2018-05-17 | Stop reason: HOSPADM

## 2018-05-15 RX ADMIN — ONDANSETRON 4 MG: 2 INJECTION, SOLUTION INTRAMUSCULAR; INTRAVENOUS at 05:05

## 2018-05-15 RX ADMIN — Medication 14 MILLI-UNITS/MIN: at 12:05

## 2018-05-15 RX ADMIN — LIDOCAINE HYDROCHLORIDE,EPINEPHRINE BITARTRATE 6 ML: 20; .005 INJECTION, SOLUTION EPIDURAL; INFILTRATION; INTRACAUDAL; PERINEURAL at 02:05

## 2018-05-15 RX ADMIN — LIDOCAINE HYDROCHLORIDE,EPINEPHRINE BITARTRATE 5 ML: 20; .005 INJECTION, SOLUTION EPIDURAL; INFILTRATION; INTRACAUDAL; PERINEURAL at 05:05

## 2018-05-15 RX ADMIN — FENTANYL CITRATE 100 MCG: 50 INJECTION, SOLUTION INTRAMUSCULAR; INTRAVENOUS at 02:05

## 2018-05-15 RX ADMIN — PHENYLEPHRINE HYDROCHLORIDE 100 MCG: 10 INJECTION INTRAVENOUS at 09:05

## 2018-05-15 RX ADMIN — SODIUM CHLORIDE, SODIUM LACTATE, POTASSIUM CHLORIDE, AND CALCIUM CHLORIDE: .6; .31; .03; .02 INJECTION, SOLUTION INTRAVENOUS at 02:05

## 2018-05-15 RX ADMIN — FLUOXETINE 20 MG: 20 CAPSULE ORAL at 08:05

## 2018-05-15 RX ADMIN — DEXTROSE, SODIUM CHLORIDE, SODIUM LACTATE, POTASSIUM CHLORIDE, AND CALCIUM CHLORIDE 1000 ML: 5; .6; .31; .03; .02 INJECTION, SOLUTION INTRAVENOUS at 10:05

## 2018-05-15 RX ADMIN — SODIUM CHLORIDE, SODIUM LACTATE, POTASSIUM CHLORIDE, AND CALCIUM CHLORIDE 500 ML/HR: .6; .31; .03; .02 INJECTION, SOLUTION INTRAVENOUS at 09:05

## 2018-05-15 RX ADMIN — KETOROLAC TROMETHAMINE 30 MG: 30 INJECTION, SOLUTION INTRAMUSCULAR; INTRAVENOUS at 06:05

## 2018-05-15 RX ADMIN — SODIUM CHLORIDE, SODIUM LACTATE, POTASSIUM CHLORIDE, AND CALCIUM CHLORIDE: 600; 310; 30; 20 INJECTION, SOLUTION INTRAVENOUS at 05:05

## 2018-05-15 RX ADMIN — PHENYLEPHRINE HYDROCHLORIDE 100 MCG: 10 INJECTION INTRAVENOUS at 05:05

## 2018-05-15 RX ADMIN — LIDOCAINE HYDROCHLORIDE,EPINEPHRINE BITARTRATE 6 ML: 20; .005 INJECTION, SOLUTION EPIDURAL; INFILTRATION; INTRACAUDAL; PERINEURAL at 08:05

## 2018-05-15 RX ADMIN — MORPHINE SULFATE 3 MG: 1 INJECTION, SOLUTION EPIDURAL; INTRATHECAL; INTRAVENOUS at 06:05

## 2018-05-15 RX ADMIN — LIDOCAINE HYDROCHLORIDE 20 MG: 20 INJECTION, SOLUTION INTRAVENOUS at 05:05

## 2018-05-15 RX ADMIN — LIDOCAINE HYDROCHLORIDE,EPINEPHRINE BITARTRATE 6 ML: 20; .005 INJECTION, SOLUTION EPIDURAL; INFILTRATION; INTRACAUDAL; PERINEURAL at 03:05

## 2018-05-15 RX ADMIN — FENTANYL CITRATE 50 MCG: 50 INJECTION, SOLUTION INTRAMUSCULAR; INTRAVENOUS at 05:05

## 2018-05-15 RX ADMIN — LIDOCAINE HYDROCHLORIDE,EPINEPHRINE BITARTRATE 10 ML: 20; .005 INJECTION, SOLUTION EPIDURAL; INFILTRATION; INTRACAUDAL; PERINEURAL at 05:05

## 2018-05-15 RX ADMIN — FENTANYL CITRATE 100 MCG: 50 INJECTION, SOLUTION INTRAMUSCULAR; INTRAVENOUS at 12:05

## 2018-05-15 RX ADMIN — ONDANSETRON 8 MG: 8 TABLET, ORALLY DISINTEGRATING ORAL at 03:05

## 2018-05-15 RX ADMIN — FENTANYL CITRATE 100 MCG: 50 INJECTION, SOLUTION INTRAMUSCULAR; INTRAVENOUS at 08:05

## 2018-05-15 RX ADMIN — OXYTOCIN 20 UNITS: 10 INJECTION, SOLUTION INTRAMUSCULAR; INTRAVENOUS at 05:05

## 2018-05-15 RX ADMIN — PROMETHAZINE HYDROCHLORIDE 12.5 MG: 25 INJECTION INTRAMUSCULAR; INTRAVENOUS at 02:05

## 2018-05-15 RX ADMIN — CEFAZOLIN 2 G: 1 INJECTION, POWDER, FOR SOLUTION INTRAMUSCULAR; INTRAVENOUS at 05:05

## 2018-05-15 RX ADMIN — SUCCINYLCHOLINE CHLORIDE 120 MG: 20 INJECTION, SOLUTION INTRAMUSCULAR; INTRAVENOUS at 05:05

## 2018-05-15 RX ADMIN — OXYTOCIN 20 UNITS: 10 INJECTION, SOLUTION INTRAMUSCULAR; INTRAVENOUS at 06:05

## 2018-05-15 RX ADMIN — LIDOCAINE HYDROCHLORIDE,EPINEPHRINE BITARTRATE 5 ML: 20; .005 INJECTION, SOLUTION EPIDURAL; INFILTRATION; INTRACAUDAL; PERINEURAL at 12:05

## 2018-05-15 RX ADMIN — PROPOFOL 150 MG: 10 INJECTION, EMULSION INTRAVENOUS at 05:05

## 2018-05-15 RX ADMIN — DOCUSATE SODIUM 200 MG: 100 CAPSULE, LIQUID FILLED ORAL at 09:05

## 2018-05-15 NOTE — ASSESSMENT & PLAN NOTE
Admit to L&D  Cervidil IOL, R&B discussed with pt and family x 2, wish to proceed   Continue to monitor maternal and fetal status  cervidil removed  Begin cytotec induction  5/14/18 1230 Comfortable  Cytotec given  FHT's reassuring  P continue induction  1900 Leaking clear fluid  SVE FT/thick/-3  P Continue induction  Epidural as requested

## 2018-05-15 NOTE — PLAN OF CARE
Problem: Labor (Cervical Ripen, Induct, Augment) (Adult,Obstetrics,Pediatric)  Intervention: Prevent/Manage Maternal Infection   05/14/18 1900   Safety Interventions   Infection Management aseptic technique maintained   Hygiene Care   Perineal Care absorbent pad changed     Intervention: Monitor/Manage Labor Pain   05/14/18 1805   Number Scale   Pain Management Interventions medication     Intervention: Provide Emotional Support and Encouragement   05/14/18 1943   Psychosocial Support   Trust Relationship/Rapport care explained;choices provided;emotional support provided;thoughts/feelings acknowledged;empathic listening provided;questions answered;questions encouraged;reassurance provided       Goal: Signs and Symptoms of Listed Potential Problems Will be Absent, Minimized or Managed (Labor)  Signs and symptoms of listed potential problems will be absent, minimized or managed by discharge/transition of care (reference Labor (Cervical Ripen, Induct, Augment) (Adult,Obstetrics,Pediatric) [facility]).    05/14/18 1943   Labor (Cervical Ripen, Induct, Augment)   Problems Assessed (Labor) all   Problems Present (Labor) none

## 2018-05-15 NOTE — PROGRESS NOTES
[facility]  Obstetrics  Labor Progress Note    Patient Name: Ghazala Mera  MRN: [id number]  Admission Date: 2018  Hospital Length of Stay: 1 days  Attending Physician: Alyssa Moe MD  Primary Care Provider: Halle Srivastava MD    Subjective:     Principal Problem:Polyhydramnios in third trimester    Hospital Course:  Admit to L&D  Cervidil IOL  Begin cytotec 50mcg po  1900 Cytotec vaginally 25 mcg, leaking clear fluid nitrazine negative   requesting epidural    Interval History:  Ghazala is a 22 y.o.  at 40w6d. She is doing well. Requesting epidural    Objective:     Vital Signs (Most Recent):  Temp: 98.8 °F (37.1 °C) (18)  Pulse: 73 (18)  Resp: 18 (18)  BP: 103/63 (18) Vital Signs (24h Range):  Temp:  [98.2 °F (36.8 °C)-98.8 °F (37.1 °C)] 98.8 °F (37.1 °C)  Pulse:  [59-93] 73  Resp:  [18] 18  BP: ()/(51-96) 103/63     Weight: 92 kg (202 lb 13.2 oz)  Body mass index is 35.93 kg/m².    FHT: 144Cat 1 (reassuring)  TOCO:  Q 2-3 minutes    Cervical Exam:  Dilation:  0  Effacement:  50%  Station: -3  Presentation: Vertex     Significant Labs:  Lab Results   Component Value Date    GROUPTRH O POS 2018    HEPBSAG Negative 2017    STREPBCULT No Group B Streptococcus isolated 2018       I have personallly reviewed all pertinent lab results from the last 24 hours.    Physical Exam:   Constitutional: She is oriented to person, place, and time. She appears well-developed and well-nourished.     Eyes: Conjunctivae are normal. Pupils are equal, round, and reactive to light.    Neck: Normal range of motion.    Cardiovascular: Normal rate and regular rhythm.     Pulmonary/Chest: Breath sounds normal.        Abdominal: Soft.     Genitourinary: Vagina normal and uterus normal.           Musculoskeletal: Normal range of motion and moves all extremeties.       Neurological: She is alert and oriented to person, place, and time.     Skin: Skin is warm.    Psychiatric: She has a normal mood and affect. Her behavior is normal. Thought content normal.       Assessment/Plan:     22 y.o. female  at 40w6d for:    Post-term pregnancy, 40-42 weeks of gestation    Admit to L&D  Cervidil IOL, R&B discussed with pt and family x 2, wish to proceed   Continue to monitor maternal and fetal status  cervidil removed  Begin cytotec induction  18 1230 Comfortable  Cytotec given  FHT's reassuring  P continue induction  1900 Leaking clear fluid  SVE FT/thick/-3  P Continue induction  Epidural as requested          History of depression    Prozac Daily              Malaika Escoto CNM  Obstetrics  [facility]

## 2018-05-15 NOTE — PROGRESS NOTES
S: Pt reports feeling pressure, tracie in place , requesting to see Dr. Malloy. Notified   O:  VS reviewed, afebrile   Vitals:    05/15/18 1045 05/15/18 1100 05/15/18 1115 05/15/18 1130   BP: (!) 101/57 (!) 115/59 116/84 134/83   Pulse: 62 72 79 90   Resp:       Temp:       TempSrc:       SpO2: 100% 100% 100% 100%   Weight:       Height:           FHTs: 140  MVU: 140  SVE: 5/70/-1    A: IUP @ 41w0d ;     Patient Active Problem List   Diagnosis    History of miscarriage, currently pregnant, first trimester    Chlamydia infection    History of depression    Polyhydramnios in third trimester    Post-term pregnancy, 40-42 weeks of gestation       P: D5LR liter bolus   Pitocin  Close monitoring  TRACIE in place

## 2018-05-15 NOTE — ASSESSMENT & PLAN NOTE
S/p cervidil  S/p cytotec  AROM clear fluid  Patient making slow progress  fht reassuring  Patient and family want to continue to labor; aware if non reassuring fetal status or cervix becomes more swollen and no cervical change would recommend c/section  (discussed with CORI Cleary CNM)

## 2018-05-15 NOTE — SUBJECTIVE & OBJECTIVE
Interval History:  Ghazala is a 22 y.o.  at 41w0d. She is doing well. Sleeping soundly    Objective:     Vital Signs (Most Recent):  Temp: 99 °F (37.2 °C) (05/15/18 0202)  Pulse: 70 (05/15/18 0302)  Resp: 18 (18 0713)  BP: (!) 99/58 (05/15/18 0302)  SpO2: 100 % (18 2134) Vital Signs (24h Range):  Temp:  [98.2 °F (36.8 °C)-99.1 °F (37.3 °C)] 99 °F (37.2 °C)  Pulse:  [48-93] 70  Resp:  [18] 18  SpO2:  [100 %] 100 %  BP: ()/(51-96) 99/58     Weight: 92 kg (202 lb 13.2 oz)  Body mass index is 35.93 kg/m².    FHT: 150Cat 1 (reassuring)  TOCO:  Q 2-4 minutes    Cervical Exam:  Dilation:  4  Effacement:  75%  Station: -3  Presentation: Vertex     Significant Labs:  Lab Results   Component Value Date    GROUPTRH O POS 2018    HEPBSAG Negative 2017    STREPBCULT No Group B Streptococcus isolated 2018       I have personallly reviewed all pertinent lab results from the last 24 hours.    Physical Exam

## 2018-05-15 NOTE — SUBJECTIVE & OBJECTIVE
Obstetric HPI:  Patient is having contractions Q 2-3, active fetal movement, No vaginal bleeding, tracie in place.    Comfortable right now; and anxiety better    This pregnancy has been complicated by + ct with  -ARMAND.     Obstetric History       T0      L0     SAB2   TAB0   Ectopic0   Multiple0   Live Births0       # Outcome Date GA Lbr Boyd/2nd Weight Sex Delivery Anes PTL Lv   3 Current            2 SAB            1 SAB                 No past medical history on file.  Past Surgical History:   Procedure Laterality Date    DILATION AND CURETTAGE OF UTERUS         PTA Medications   Medication Sig    FLUoxetine (PROZAC) 20 MG capsule Take 1 capsule (20 mg total) by mouth once daily.    PNV NO.5/IRON ASP GLY/DOCUS/FA (PRENATAL VIT #5-IRON-FA-DSS ORAL) Take 1 tablet by mouth once daily.    promethazine (PHENERGAN) 25 MG tablet Take 1 tablet (25 mg total) by mouth every 6 (six) hours as needed for Nausea.       Review of patient's allergies indicates:  No Known Allergies     Family History     Problem Relation (Age of Onset)    Stroke Father        Social History Main Topics    Smoking status: Former Smoker    Smokeless tobacco: Never Used    Alcohol use No    Drug use: No    Sexual activity: Yes     Partners: Male     Review of Systems   Musculoskeletal:        Tracie in place    All other systems reviewed and are negative.     Objective:     Vital Signs (Most Recent):  Temp: 98.9 °F (37.2 °C) (05/15/18 0752)  Pulse: 65 (05/15/18 0600)  Resp: 18 (18 0713)  BP: (!) 101/57 (05/15/18 0700)  SpO2: 100 % (18 2134) Vital Signs (24h Range):  Temp:  [98.8 °F (37.1 °C)-99.1 °F (37.3 °C)] 98.9 °F (37.2 °C)  Pulse:  [48-80] 65  SpO2:  [100 %] 100 %  BP: ()/(51-96) 101/57     Weight: 92 kg (202 lb 13.2 oz)  Body mass index is 35.93 kg/m².    FHT: 140Cat 1 (reassuring)  TOCO: IUPC, inserted     Physical Exam:   Constitutional: She is oriented to person, place, and time. She appears well-developed  and well-nourished.    HENT:   Head: Normocephalic.    Eyes: EOM are normal. Pupils are equal, round, and reactive to light.    Neck: Normal range of motion.    Cardiovascular: Normal rate and regular rhythm.     Pulmonary/Chest: Effort normal.        Abdominal: Soft.             Musculoskeletal:   Sebastian in place        Neurological: She is alert and oriented to person, place, and time.    Skin: Skin is warm and dry.    Psychiatric: Judgment and thought content normal.   Crying reports anxious and feeling pressure    VE done with no cervical change since 400 am. iupc inserted, will closely monitor. D/W Dr. Malloy.     Cervix:  Dilation:  6  Effacement:  90 (edematous, but swelling reduced  Station: 0 to +1--caput present   Presentation: Vertex     Significant Labs:  Lab Results   Component Value Date    GROUPTRH O POS 05/13/2018    HEPBSAG Negative 09/19/2017    STREPBCULT No Group B Streptococcus isolated 04/11/2018       I have personallly reviewed all pertinent lab results from the last 24 hours.

## 2018-05-15 NOTE — ANESTHESIA PROCEDURE NOTES
Epidural    Patient location during procedure: OB   Reason for block: primary anesthetic   Diagnosis: iup   Start time: 5/14/2018 9:02 PM  Timeout: 5/14/2018 9:00 PM  End time: 5/14/2018 9:15 PM  Staffing  Anesthesiologist: NASH GARCIA  Resident/CRNA: PILAR LUCERO  Performed: anesthesiologist   Preanesthetic Checklist  Completed: patient identified, pre-op evaluation, timeout performed, IV checked, risks and benefits discussed, monitors and equipment checked, anesthesia consent given, hand hygiene performed and patient being monitored  Preparation  Patient position: sitting  Prep: Betadine  Epidural  Skin Anesthetic: lidocaine 1%  Skin Wheal: 3 mL  Administration type: continuous  Approach: midline  Interspace: L3-4  Injection technique: JAVI saline  Needle and Epidural Catheter  Needle type: Tuohy   Needle gauge: 18  Needle length: 3.5 inches  Needle insertion depth: 9 cm  Catheter type: multi-orifice  Catheter size: 20 G  Catheter at skin depth: 19 cm  Test dose: 3 mL of lidocaine 1.5% with Epi 1-to-200,000  Additional Documentation: incremental injection, negative aspiration for heme and CSF, no signs/symptoms of IV or SA injection, no paresthesia on injection, no significant pain on injection and no significant complaints from patient  Needle localization: anatomical landmarks  Medications:  Bolus administered: 12 mL of 0.2% ropivacaine  Assessment  Ease of block: easy  Patient's tolerance of the procedure: comfortable throughout block

## 2018-05-15 NOTE — SUBJECTIVE & OBJECTIVE
Interval History:  Ghazala is a 22 y.o.  at 40w6d. She is doing well. Requesting epidural    Objective:     Vital Signs (Most Recent):  Temp: 98.8 °F (37.1 °C) (18)  Pulse: 73 (18)  Resp: 18 (18 0713)  BP: 103/63 (18) Vital Signs (24h Range):  Temp:  [98.2 °F (36.8 °C)-98.8 °F (37.1 °C)] 98.8 °F (37.1 °C)  Pulse:  [59-93] 73  Resp:  [18] 18  BP: ()/(51-96) 103/63     Weight: 92 kg (202 lb 13.2 oz)  Body mass index is 35.93 kg/m².    FHT: 144Cat 1 (reassuring)  TOCO:  Q 2-3 minutes    Cervical Exam:  Dilation:  0  Effacement:  50%  Station: -3  Presentation: Vertex     Significant Labs:  Lab Results   Component Value Date    GROUPTRH O POS 2018    HEPBSAG Negative 2017    STREPBCULT No Group B Streptococcus isolated 2018       I have personallly reviewed all pertinent lab results from the last 24 hours.    Physical Exam:   Constitutional: She is oriented to person, place, and time. She appears well-developed and well-nourished.     Eyes: Conjunctivae are normal. Pupils are equal, round, and reactive to light.    Neck: Normal range of motion.    Cardiovascular: Normal rate and regular rhythm.     Pulmonary/Chest: Breath sounds normal.        Abdominal: Soft.     Genitourinary: Vagina normal and uterus normal.           Musculoskeletal: Normal range of motion and moves all extremeties.       Neurological: She is alert and oriented to person, place, and time.    Skin: Skin is warm.    Psychiatric: She has a normal mood and affect. Her behavior is normal. Thought content normal.

## 2018-05-15 NOTE — ASSESSMENT & PLAN NOTE
Admit to L&D  Cervidil IOL, R&B discussed with pt and family x 2, wish to proceed   Continue to monitor maternal and fetal status  cervidil removed  Begin cytotec induction  5/14/18 1230 Comfortable  Cytotec given  FHT's reassuring  P continue induction  1900 Leaking clear fluid  SVE FT/thick/-3  P Continue induction

## 2018-05-15 NOTE — PROGRESS NOTES
S: Pt c/o pressure and lower back ache  O:  VS reviewed, afebrile   Vitals:    05/15/18 1425 05/15/18 1430 05/15/18 1435 05/15/18 1440   BP: (!) 128/90 122/86 122/73 113/68   Pulse: 103 105 92 90   Resp:       Temp:       TempSrc:       SpO2: 100%  99% 99%   Weight:       Height:           FHTs:140, cat 1   UC: IUPC 125  SVE: 7/80/0    A: IUP @ 41w0d ;     Patient Active Problem List   Diagnosis    History of miscarriage, currently pregnant, first trimester    Chlamydia infection    History of depression    Polyhydramnios in third trimester    Post-term pregnancy, 40-42 weeks of gestation       P: Continue pitocin  ANDREA in place, anesthesia dosing  Close monitoring

## 2018-05-15 NOTE — SUBJECTIVE & OBJECTIVE
Interval History:  Ghazala is a 22 y.o.  at 40w6d. She is doing well.     Objective:     Vital Signs (Most Recent):  Temp: 98.8 °F (37.1 °C) (18)  Pulse: 73 (18)  Resp: 18 (18 0713)  BP: 103/63 (18) Vital Signs (24h Range):  Temp:  [98.2 °F (36.8 °C)-98.8 °F (37.1 °C)] 98.8 °F (37.1 °C)  Pulse:  [59-93] 73  Resp:  [18] 18  BP: ()/(51-96) 103/63     Weight: 92 kg (202 lb 13.2 oz)  Body mass index is 35.93 kg/m².    FHT: 135Cat 1 (reassuring)  TOCO:  Q 2-3 minutes    Cervical Exam:  Dilation:  0  Effacement:  25%  Station: -3  Presentation: Vertex     Significant Labs:  Lab Results   Component Value Date    GROUPTRH O POS 2018    HEPBSAG Negative 2017    STREPBCULT No Group B Streptococcus isolated 2018       I have personallly reviewed all pertinent lab results from the last 24 hours.    Physical Exam:   Constitutional: She is oriented to person, place, and time. She appears well-developed and well-nourished.     Eyes: Conjunctivae are normal. Pupils are equal, round, and reactive to light.    Neck: Normal range of motion.    Cardiovascular: Normal rate and regular rhythm.     Pulmonary/Chest: Breath sounds normal.        Abdominal: Soft.     Genitourinary: Vagina normal and uterus normal.           Musculoskeletal: Normal range of motion and moves all extremeties.       Neurological: She is alert and oriented to person, place, and time.    Skin: Skin is warm.    Psychiatric: She has a normal mood and affect. Her behavior is normal. Thought content normal.

## 2018-05-15 NOTE — PROGRESS NOTES
[facility]  Obstetrics  Labor Progress Note    Patient Name: Ghazala Mera  MRN: [id number]  Admission Date: 2018  Hospital Length of Stay: 2 days  Attending Physician: Alyssa Moe MD  Primary Care Provider: Halle Srivastava MD    Subjective:     Principal Problem:Polyhydramnios in third trimester    Hospital Course:  Admit to L&D  Cervidil IOL  Begin cytotec 50mcg po  1900 Cytotec vaginally 25 mcg, leaking clear fluid nitrazine negative   requesting epidural  5/15/18 -AROM Pitocin at 4mu    Obstetric HPI:  Patient is having contractions Q 2-3, active fetal movement, No vaginal bleeding, tracie in place.    Comfortable right now; and anxiety better    This pregnancy has been complicated by + ct with  -ARMAND.     Obstetric History       T0      L0     SAB2   TAB0   Ectopic0   Multiple0   Live Births0       # Outcome Date GA Lbr Boyd/2nd Weight Sex Delivery Anes PTL Lv   3 Current            2 SAB            1 SAB                 No past medical history on file.  Past Surgical History:   Procedure Laterality Date    DILATION AND CURETTAGE OF UTERUS         PTA Medications   Medication Sig    FLUoxetine (PROZAC) 20 MG capsule Take 1 capsule (20 mg total) by mouth once daily.    PNV NO.5/IRON ASP GLY/DOCUS/FA (PRENATAL VIT #5-IRON-FA-DSS ORAL) Take 1 tablet by mouth once daily.    promethazine (PHENERGAN) 25 MG tablet Take 1 tablet (25 mg total) by mouth every 6 (six) hours as needed for Nausea.       Review of patient's allergies indicates:  No Known Allergies     Family History     Problem Relation (Age of Onset)    Stroke Father        Social History Main Topics    Smoking status: Former Smoker    Smokeless tobacco: Never Used    Alcohol use No    Drug use: No    Sexual activity: Yes     Partners: Male     Review of Systems   Musculoskeletal:        Tracie in place    All other systems reviewed and are negative.     Objective:     Vital Signs (Most Recent):  Temp: 98.9 °F  (37.2 °C) (05/15/18 0752)  Pulse: 65 (05/15/18 0600)  Resp: 18 (18 0713)  BP: (!) 101/57 (05/15/18 0700)  SpO2: 100 % (18 2134) Vital Signs (24h Range):  Temp:  [98.8 °F (37.1 °C)-99.1 °F (37.3 °C)] 98.9 °F (37.2 °C)  Pulse:  [48-80] 65  SpO2:  [100 %] 100 %  BP: ()/(51-96) 101/57     Weight: 92 kg (202 lb 13.2 oz)  Body mass index is 35.93 kg/m².    FHT: 140Cat 1 (reassuring)  TOCO: IUPC, inserted     Physical Exam:   Constitutional: She is oriented to person, place, and time. She appears well-developed and well-nourished.    HENT:   Head: Normocephalic.    Eyes: EOM are normal. Pupils are equal, round, and reactive to light.    Neck: Normal range of motion.    Cardiovascular: Normal rate and regular rhythm.     Pulmonary/Chest: Effort normal.        Abdominal: Soft.             Musculoskeletal:   Sebastian in place        Neurological: She is alert and oriented to person, place, and time.    Skin: Skin is warm and dry.    Psychiatric: Judgment and thought content normal.   Crying reports anxious and feeling pressure    VE done with no cervical change since 400 am. iupc inserted, will closely monitor. D/W Dr. Malloy.     Cervix:  Dilation:  6  Effacement:  90 (edematous, but swelling reduced  Station: 0 to +1--caput present   Presentation: Vertex     Significant Labs:  Lab Results   Component Value Date    GROUPTRH O POS 2018    HEPBSAG Negative 2017    STREPBCULT No Group B Streptococcus isolated 2018       I have personallly reviewed all pertinent lab results from the last 24 hours.    Assessment/Plan:     22 y.o. female  at 41w0d for:    Post-term pregnancy, 40-42 weeks of gestation    S/p cervidil  S/p cytotec  AROM clear fluid  Patient making slow progress  fht reassuring  Patient and family want to continue to labor; aware if non reassuring fetal status or cervix becomes more swollen and no cervical change would recommend c/section  (discussed with CORI Cleary CNM)         History of depression    Prozac Daily              Minoo Malloy MD  Obstetrics  [facility]

## 2018-05-15 NOTE — ASSESSMENT & PLAN NOTE
Has made no further cervical change  Patient is ready to proceed with primary c/section  Reviewed c/section procedure in detail. Reviewed risks including but not limited to infection, bleeding, damage to bowel/bladder, cva;htn, death.  All questions answered to the best of my ability.  Alternatives reviewedConsents signed and witnessed.  Case request submitted  Ancef/azithromycin preop  OR/Anesthesia aware

## 2018-05-15 NOTE — ASSESSMENT & PLAN NOTE
Admit to L&D  Cervidil IOL, R&B discussed with pt and family x 2, wish to proceed   Continue to monitor maternal and fetal status  cervidil removed  Begin cytotec induction  5/14/18 1230 Comfortable  Cytotec given  FHT's reassuring  P Continue induction  Epidural as requested  AROM clear fluid

## 2018-05-15 NOTE — L&D DELIVERY NOTE
[facility]   Section   Operative Note    SUMMARY     Date of Procedure: 5/15/2018     Procedure: Procedure(s) (LRB):  DELIVERY- SECTION (N/A)    Surgeon(s) and Role:     * Minoo Malloy MD - Primary    Assisting Surgeon: Noris Cleary, Midwife    Pre-Operative Diagnosis: Failure to progress    Post-Operative Diagnosis: Post-Op Diagnosis Codes:     * Pregnant [Z34.90]    Anesthesia: Spinal/Epidural    Technical Procedures Used: primary low transverse  section           Description of the Findings of the Procedure: vertex with caput, wedged in pelvis; light meconium stained fluid, normal tubes and ovaries; edematous bladder; nuchal cord x 1    Significant Surgical Tasks Conducted by the Assistant(s), if Applicable: 1st assist    Complications: No    Blood Loss: * No values recorded between 5/15/2018  5:28 PM and 5/15/2018  6:39 PM * 800cc     With patient in supine position, the legs are  and Tejada Catheter placed and positioning to supine done.   Abdomen prepped with Chloroprep and 3 minute drying time allowed prior to draping of the abdomen.   Time out taken with OR team members.  Pfannenstiel Incision made through the skin, transverse fascial incision developed, rectus muscles  in the midline and the peritoneum entered.   no adhesions noted.  The lower uterine segment and position of the fetus identified.   Bladder flap taken down through transverse peritoneal incision.    Low Transverse Incision made through well developed lower uterine segment and extended laterally with blunt dissection.   thinmeconium fluid noted.  Infant delivered from vertex presentation.  Cord clamped after one minute and  handed to attending nurse.  Cord blood taken, placenta delivered.  The uterus was exteriorized.  The edges of the uterine incision are grasped with Bolaños clamps at the angles and the inferior and superior midline edges of the incision.  Extension of  the incision was noted on the right.  Closure with running lock 0 Chromic, starting at each angle, tying in the midline.  Double layer closure.   Observation for bleeding with suture of any bleeding along the hysterotomy line.   With good hemostasis noted, the anterior pelvis is rinsed with sterile saline.   Right and left adnexa with normal anatomy.     The uterus was then returned to the abdomen, gutters cleared of all clots and debris.  The hysterotomy incisions were examined and noted to be hemostatic.   Subfascial incisions were made hemostatic with cautery.    The fascia was closed with 0 vicryl starting at each angle and tying the knot in the midline after locking the first.  Subcutaneous tissue was copiously irrigated and made hemostatic with cautery.  Subcutaneous space was closed with 3-0 plain.        Skin closure with 4 0 monocryl subcuticular.  Wound dressed with aquasel.          Specimens:   Specimen (12h ago through future)    None          Condition: Good    Disposition: PACU - hemodynamically stable.    Attestation: Good         Delivery Information for  Ochoa Mera    Birth information:  YOB: 2019   Time of birth: 5:31 PM   Sex: male   Head Delivery Date/Time: 5/15/2018  5:31 PM   Delivery type: , Low Transverse   Gestational Age: 41w0d    Delivery Providers    Delivering clinician:  Minoo Malloy MD   Provider Role    Jesenia Singer RN Registered Nurse    Em Kuhn RN Registered Nurse    Vanessa Mahan, SHELBY Nurse Anesthetist    Waqar Modi, IBRAHIMA Midwife    Noris Cleary, IBRAHIMA Midwife                 Assessment    Living status:  Living  Apgars:     1 Minute:   5 Minute:   10 Minute:   15 Minute:   20 Minute:     Skin Color:          Heart Rate:          Reflex Irritability:          Muscle Tone:          Respiratory Effort:          Total:                         Assisted Delivery Details:    Forceps attempted?:  No  Vacuum extractor  attempted?:  No         Shoulder Dystocia    Shoulder dystocia present?:  No           Presentation and Position    Presentation:  Vertex           Interventions/Resuscitation    Method:  Bulb Suctioning, Tactile Stimulation       Cord    Delayed Cord Clamping?:  No  Cord Clamped Date/Time:  5/15/2018  5:31 PM  Cord Blood Disposition:  Lab  Gases Sent?:  No       Placenta    Date and time:  5/15/2018  5:32 PM  Removal:  Manual removal  Appearance:  Intact  Placenta disposition:  discarded           Labor Events:       labor: No     Labor Onset Date/Time:         Dilation Complete Date/Time:         Start Pushing Date/Time:       Rupture Date/Time:              Rupture type:           Fluid Amount:        Fluid Color:        Fluid Odor:        Membrane Status (PeriCalm): ARM (Artificial Rupture)      Rupture Date/Time (PeriCalm): 05/15/2018 04:01:00      Fluid Amount (PeriCalm): Large      Fluid Color (PeriCalm): Clear       steroids: None     Antibiotics given for GBS: No     Induction: oxytocin     Indications for induction:  Post-term Gestation     Augmentation: oxytocin     Indications for augmentation:       Labor complications:       Additional complications:          Cervical ripening:                     Delivery:      Episiotomy: None     Indication for Episiotomy:       Perineal Lacerations: None Repaired:      Periurethral Laceration: none Repaired:     Labial Laceration: none Repaired:     Sulcus Laceration: none Repaired:     Vaginal Laceration: No Repaired:     Cervical Laceration: No Repaired:     Repair suture:       Repair # of packets:       Vaginal delivery QBL (mL):        QBL (mL): 0     Combined Blood Loss (mL): 0     Vaginal Sweep Performed:       Surgicount Correct:         Other providers:       Anesthesia    Method:  General, Epidural          Details (if applicable):  Trial of Labor Yes    Categorization: Primary    Priority: Routine    Indications for :     Incision Type:       Additional  information:  Forceps:    Vacuum:    Breech:    Observed anomalies    Other (Comments):

## 2018-05-15 NOTE — PROGRESS NOTES
[facility]  Obstetrics  Labor Progress Note    Patient Name: Ghazala Mera  MRN: [id number]  Admission Date: 2018  Hospital Length of Stay: 1 days  Attending Physician: Alyssa Moe MD  Primary Care Provider: Halle Srivastava MD    Subjective:     Principal Problem:Polyhydramnios in third trimester    Hospital Course:  Admit to L&D  Cervidil IOL  Begin cytotec 50mcg po  1900 Cytotec vaginally 25 mcg, leaking clear fluid    Interval History:  Ghazala is a 22 y.o.  at 40w6d. She is doing well.     Objective:     Vital Signs (Most Recent):  Temp: 98.8 °F (37.1 °C) (18)  Pulse: 73 (18)  Resp: 18 (18)  BP: 103/63 (18) Vital Signs (24h Range):  Temp:  [98.2 °F (36.8 °C)-98.8 °F (37.1 °C)] 98.8 °F (37.1 °C)  Pulse:  [59-93] 73  Resp:  [18] 18  BP: ()/(51-96) 103/63     Weight: 92 kg (202 lb 13.2 oz)  Body mass index is 35.93 kg/m².    FHT: 135Cat 1 (reassuring)  TOCO:  Q 2-3 minutes    Cervical Exam:  Dilation:  0  Effacement:  25%  Station: -3  Presentation: Vertex     Significant Labs:  Lab Results   Component Value Date    GROUPTRH O POS 2018    HEPBSAG Negative 2017    STREPBCULT No Group B Streptococcus isolated 2018       I have personallly reviewed all pertinent lab results from the last 24 hours.    Physical Exam:   Constitutional: She is oriented to person, place, and time. She appears well-developed and well-nourished.     Eyes: Conjunctivae are normal. Pupils are equal, round, and reactive to light.    Neck: Normal range of motion.    Cardiovascular: Normal rate and regular rhythm.     Pulmonary/Chest: Breath sounds normal.        Abdominal: Soft.     Genitourinary: Vagina normal and uterus normal.           Musculoskeletal: Normal range of motion and moves all extremeties.       Neurological: She is alert and oriented to person, place, and time.    Skin: Skin is warm.    Psychiatric: She has a normal mood and  affect. Her behavior is normal. Thought content normal.       Assessment/Plan:     22 y.o. female  at 40w6d for:    Post-term pregnancy, 40-42 weeks of gestation    Admit to L&D  Cervidil IOL, R&B discussed with pt and family x 2, wish to proceed   Continue to monitor maternal and fetal status  cervidil removed  Begin cytotec induction  18 1230 Comfortable  Cytotec given  FHT's reassuring  P continue induction  1900 Leaking clear fluid  SVE FT/thick/-3  P Continue induction          History of depression    Prozac Daily            CORI Escoto CNM  Obstetrics  [facility]

## 2018-05-15 NOTE — ANESTHESIA PREPROCEDURE EVALUATION
05/14/2018  Ghazala Mera is a 22 y.o., female.    Anesthesia Evaluation    I have reviewed the Patient Summary Reports.    I have reviewed the Nursing Notes.      Review of Systems  Anesthesia Hx:  No problems with previous Anesthesia  Denies Family Hx of Anesthesia complications.   Denies Personal Hx of Anesthesia complications.   Social:  Non-Smoker    Hematology/Oncology:  Hematology Normal   Oncology Normal     EENT/Dental:EENT/Dental Normal   Cardiovascular:  Cardiovascular Normal Exercise tolerance: good     Pulmonary:  Pulmonary Normal    Renal/:  Renal/ Normal     Hepatic/GI:  Hepatic/GI Normal    Musculoskeletal:  Musculoskeletal Normal    Neurological:  Neurology Normal    Endocrine:  Endocrine Normal    Dermatological:  Skin Normal    Psych:  Psychiatric Normal           Physical Exam  General:  Obesity    Airway/Jaw/Neck:  Airway Findings: Mouth Opening: Normal Tongue: Normal  General Airway Assessment: Adult  Mallampati: II  Improves to II with phonation.  TM Distance: Normal, at least 6 cm        Eyes/Ears/Nose:  EYES/EARS/NOSE FINDINGS: Normal   Dental:  Dental Findings: In tact   Chest/Lungs:  Chest/Lungs Findings: Normal Respiratory Rate     Heart/Vascular:  Heart Findings: Rhythm: Regular Rhythm  Heart murmur: negative Vascular Findings: Normal    Abdomen:  Abdomen Findings: Normal    Musculoskeletal:  Musculoskeletal Findings: Normal   Skin:  Skin Findings: Normal    Mental Status:  Mental Status Findings:  Cooperative, Alert and Oriented         Anesthesia Plan  Type of Anesthesia, risks & benefits discussed:  Anesthesia Type:  epidural  Patient's Preference:   Intra-op Monitoring Plan:   Intra-op Monitoring Plan Comments:   Post Op Pain Control Plan:   Post Op Pain Control Plan Comments:   Induction:    Beta Blocker:  Patient is not currently on a Beta-Blocker (No further  documentation required).       Informed Consent: Patient understands risks and agrees with Anesthesia plan.  Questions answered. Anesthesia consent signed with patient.  ASA Score: 2     Day of Surgery Review of History & Physical: I have interviewed and examined the patient. I have reviewed the patient's H&P dated:  There are no significant changes.          Ready For Surgery From Anesthesia Perspective.

## 2018-05-15 NOTE — PROGRESS NOTES
[facility]  Obstetrics  Labor Progress Note    Patient Name: Ghazala Mera  MRN: [id number]  Admission Date: 2018  Hospital Length of Stay: 2 days  Attending Physician: Alyssa Moe MD  Primary Care Provider: Halle Srivastava MD    Subjective:     Principal Problem:Polyhydramnios in third trimester    Hospital Course:  Admit to L&D  Cervidil IOL  Begin cytotec 50mcg po  1900 Cytotec vaginally 25 mcg, leaking clear fluid nitrazine negative   requesting epidural  5/15/18 -AROM Pitocin at 4mu    Interval History:  Ghazala is a 22 y.o.  at 41w0d. She is doing well. Sleeping soundly    Objective:     Vital Signs (Most Recent):  Temp: 99 °F (37.2 °C) (05/15/18 0202)  Pulse: 70 (05/15/18 0302)  Resp: 18 (18 0713)  BP: (!) 99/58 (05/15/18 0302)  SpO2: 100 % (18 2134) Vital Signs (24h Range):  Temp:  [98.2 °F (36.8 °C)-99.1 °F (37.3 °C)] 99 °F (37.2 °C)  Pulse:  [48-93] 70  Resp:  [18] 18  SpO2:  [100 %] 100 %  BP: ()/(51-96) 99/58     Weight: 92 kg (202 lb 13.2 oz)  Body mass index is 35.93 kg/m².    FHT: 150Cat 1 (reassuring)  TOCO:  Q 2-4 minutes    Cervical Exam:  Dilation:  4  Effacement:  75%  Station: -3  Presentation: Vertex     Significant Labs:  Lab Results   Component Value Date    GROUPTRH O POS 2018    HEPBSAG Negative 2017    STREPBCULT No Group B Streptococcus isolated 2018       I have personallly reviewed all pertinent lab results from the last 24 hours.    Physical Exam    Assessment/Plan:     22 y.o. female  at 41w0d for:    Post-term pregnancy, 40-42 weeks of gestation    Admit to L&D  Cervidil IOL, R&B discussed with pt and family x 2, wish to proceed   Continue to monitor maternal and fetal status  cervidil removed  Begin cytotec induction  18 1230 Comfortable  Cytotec given  FHT's reassuring  P Continue induction  Epidural as requested  AROM clear fluid          History of depression    Prozac Daily              Malaika  IBRAHIMA Escoto  Obstetrics  [facility]

## 2018-05-16 PROCEDURE — 11000001 HC ACUTE MED/SURG PRIVATE ROOM

## 2018-05-16 PROCEDURE — 63600175 PHARM REV CODE 636 W HCPCS: Performed by: OBSTETRICS & GYNECOLOGY

## 2018-05-16 PROCEDURE — 25000003 PHARM REV CODE 250: Performed by: OBSTETRICS & GYNECOLOGY

## 2018-05-16 PROCEDURE — 25000003 PHARM REV CODE 250: Performed by: ADVANCED PRACTICE MIDWIFE

## 2018-05-16 RX ORDER — ONDANSETRON 8 MG/1
8 TABLET, ORALLY DISINTEGRATING ORAL EVERY 8 HOURS PRN
Status: DISCONTINUED | OUTPATIENT
Start: 2018-05-16 | End: 2018-05-17 | Stop reason: HOSPADM

## 2018-05-16 RX ADMIN — OXYCODONE HYDROCHLORIDE AND ACETAMINOPHEN 1 TABLET: 10; 325 TABLET ORAL at 09:05

## 2018-05-16 RX ADMIN — KETOROLAC TROMETHAMINE 30 MG: 30 INJECTION INTRAMUSCULAR; INTRAVENOUS at 06:05

## 2018-05-16 RX ADMIN — DOCUSATE SODIUM 200 MG: 100 CAPSULE, LIQUID FILLED ORAL at 08:05

## 2018-05-16 RX ADMIN — KETOROLAC TROMETHAMINE 30 MG: 30 INJECTION INTRAMUSCULAR; INTRAVENOUS at 12:05

## 2018-05-16 RX ADMIN — DOCUSATE SODIUM 200 MG: 100 CAPSULE, LIQUID FILLED ORAL at 07:05

## 2018-05-16 RX ADMIN — FLUOXETINE 20 MG: 20 CAPSULE ORAL at 07:05

## 2018-05-16 RX ADMIN — OXYCODONE HYDROCHLORIDE AND ACETAMINOPHEN 1 TABLET: 10; 325 TABLET ORAL at 03:05

## 2018-05-16 RX ADMIN — SIMETHICONE CHEW TAB 80 MG 80 MG: 80 TABLET ORAL at 09:05

## 2018-05-16 RX ADMIN — OXYCODONE HYDROCHLORIDE AND ACETAMINOPHEN 1 TABLET: 10; 325 TABLET ORAL at 08:05

## 2018-05-16 RX ADMIN — KETOROLAC TROMETHAMINE 30 MG: 30 INJECTION INTRAMUSCULAR; INTRAVENOUS at 05:05

## 2018-05-16 NOTE — LACTATION NOTE
Lactation Rounds:    Mother has been syringe feeding formula. She was given general anesthesia during delivery and states she was not able to hold the infant for a few hours after delivery. Infant was given 10 mls of formula via syringe by nurse at 1 hour old. Mother is unsure why she was still using formula a states she would like to only breast feed. Reviewed risks of supplementation. Discussed adequacy of colostrum. Instructed mother on normal  feeding and sleeping patterns. Encouraged mother to breastfeed infant a minimum of 8 times in 24 hours prior to supplementation to promote appropriate breast stimulation for adequate milk supply.    Infant asleep doing skin to skin. Discussed waking techniques with mother. Infant now showing feeding cues. Assisted mother into cross cradle to left breast. After a few attempts infant able to obtain a deep asymmetric latch. Infant pulls off a few times and relatches. Some clicking noises were heard in the beginning of feeding but were resolved. Audible swallows noted. Mother states she is uncomfortable and would like to try football hold. Assisted mother into football hold on the left breast. Infant able to obtain a deep asymmetric latch. Audible swallows noted. Infant fed until content. Nipple shape and color wnl. Infant showing feeding cues and assisted into football hold to the right breast. Infant able to obtain a deep asymmetric latch with audible swallows noted. Infant fed until content. Nipple shape and color wnl.      18 0920   Maternal Infant Assessment   Breast Shape Bilateral:;round   Breast Density Bilateral:;soft   Areola Bilateral:;elastic   Nipple(s) Bilateral:;graspable   Infant Assessment   Sucking Reflex present   Rooting Reflex present   Swallow Reflex present   Number of Stools (24 hours) 2   Number of Voids (24 hours) 1   LATCH Score   Latch 1-->repeated attempts, holds nipple in mouth, stimulate to suck   Audible Swallowing 1-->a few with  "stimulation   Type Of Nipple 2-->everted (after stimulation)   Comfort (Breast/Nipple) 2-->soft/nontender   Hold (Positioning) 1-->minimal assist, teach one side: mother does other, staff holds   Score (less than 7 for 2/more consecutive times, consult Lactation Consultant) 7   Maternal Infant Feeding   Infant Positioning clutch/"football";cross-cradle   Signs of Milk Transfer audible swallow;infant jaw motion present   Presence of Pain no   Nipple Shape After Feeding, Left wnl   Nipple Shape After Feeding, Right wnl   Breastfeeding Education adequate infant intake;adequate milk volume;importance of skin-to-skin contact;increasing milk supply;milk expression, hand   Feeding Infant   Feeding Readiness Cues rooting;smacking   Satiety Cues calm after feeding;cessation of sucking;infant releases breast;sleeping after feeding   Effective Latch During Feeding yes   Audible Swallow yes   Suck/Swallow Coordination present   Lactation Interventions   Attachment Promotion breastfeeding assistance provided;face-to-face positioning promoted;family involvement promoted;infant-mother separation minimized;privacy provided;role responsibility promoted;rooming-in promoted;skin-to-skin contact encouraged   Breast Care: Breastfeeding milk massaged towards nipple   Breastfeeding Assistance assisted with positioning;both breasts offered each feeding;feeding cue recognition promoted;feeding on demand promoted;feeding session observed;infant latch-on verified;infant stimulated to wakeful state;infant suck/swallow verified;support offered   Maternal Breastfeeding Support diary/feeding log utilized;encouragement offered;infant-mother separation minimized;lactation counseling provided;maternal hydration promoted;maternal nutrition promoted;maternal rest encouraged   Latch Promotion suck stimulated with colostrum drop     "

## 2018-05-16 NOTE — SUBJECTIVE & OBJECTIVE
Hospital course: Admit to L&D  Cervidil IOL  Begin cytotec 50mcg po  1900 Cytotec vaginally 25 mcg, leaking clear fluid nitrazine negative  2035 requesting epidural  5/15/18 -AROM Pitocin at 4mu    Interval History:     She is doing well this morning. She is tolerating a regular diet without nausea or vomiting. She is voiding spontaneously. She is ambulating. She has passed flatus, and has not a BM. Vaginal bleeding is mild. She denies fever or chills. Abdominal pain is mild and controlled with oral medications. She is breastfeeding. She desires circumcision for her male baby: yes.    Objective:     Vital Signs (Most Recent):  Temp: 98.3 °F (36.8 °C) (05/16/18 0400)  Pulse: 86 (05/16/18 0400)  Resp: 18 (05/16/18 0400)  BP: 112/68 (05/16/18 0400)  SpO2: 95 % (05/15/18 2000) Vital Signs (24h Range):  Temp:  [97.3 °F (36.3 °C)-99 °F (37.2 °C)] 98.3 °F (36.8 °C)  Pulse:  [] 86  Resp:  [14-18] 18  SpO2:  [95 %-100 %] 95 %  BP: ()/() 112/68     Weight: 92 kg (202 lb 13.2 oz)  Body mass index is 35.93 kg/m².      Intake/Output Summary (Last 24 hours) at 05/16/18 0756  Last data filed at 05/16/18 0600   Gross per 24 hour   Intake             2000 ml   Output             3400 ml   Net            -1400 ml       Significant Labs:  Lab Results   Component Value Date    GROUPTRH O POS 05/13/2018    HEPBSAG Negative 09/19/2017    STREPBCULT No Group B Streptococcus isolated 04/11/2018     No results for input(s): HGB, HCT in the last 48 hours.    I have personallly reviewed all pertinent lab results from the last 24 hours.    Physical Exam:   Constitutional: She is oriented to person, place, and time. She appears well-developed and well-nourished.    HENT:   Head: Normocephalic.     Neck: Normal range of motion. No thyromegaly present.    Cardiovascular: Normal rate and regular rhythm.     Pulmonary/Chest: Effort normal and breath sounds normal.        Abdominal: Soft. Bowel sounds are normal. She exhibits  abdominal incision (aquasel dressing clean dry & intact).     Genitourinary:   Genitourinary Comments: Fundus nontender near the umbilicus             Musculoskeletal: Normal range of motion and moves all extremeties.       Neurological: She is alert and oriented to person, place, and time.    Skin: Skin is warm.    Psychiatric: She has a normal mood and affect.

## 2018-05-16 NOTE — TRANSFER OF CARE
"Anesthesia Transfer of Care Note    Patient: Ghazala Mera    Procedure(s) Performed: * No procedures listed *    Patient location: Labor and Delivery    Anesthesia Type: epidural and general    Transport from OR: Transported from OR on room air with adequate spontaneous ventilation    Post pain: adequate analgesia    Post assessment: no apparent anesthetic complications    Post vital signs: stable    Level of consciousness: awake    Nausea/Vomiting: no nausea/vomiting    Complications: none    Transfer of care protocol was followed      Last vitals:   Visit Vitals  /68   Pulse 87   Temp 37.2 °C (98.9 °F) (Oral)   Resp 18   Ht 5' 3" (1.6 m)   Wt 92 kg (202 lb 13.2 oz)   LMP 07/02/2017   SpO2 100%   Breastfeeding? No   BMI 35.93 kg/m²     "

## 2018-05-16 NOTE — PROGRESS NOTES
Received call from Velvet Smith , [phone]. She is available to assist with patient's discharge needs.

## 2018-05-16 NOTE — ASSESSMENT & PLAN NOTE
POD#1 s/p primary LTCS due to failure to progress.  Patient doing well.  Discussed goals for discharge and encouraged ambulation.  Anticipate stable for discharge in 1-2 days.

## 2018-05-16 NOTE — PLAN OF CARE
Problem: Patient Care Overview  Goal: Plan of Care Review  Outcome: Ongoing (interventions implemented as appropriate)  Mom doing well, vital signs are stable. No complaints voiced at this time. Tejada draining well, clear yellow urine. Breastfeeding baby, and syringe feeding with formula. Will monitor.

## 2018-05-16 NOTE — ANESTHESIA RELEASE NOTE
"Anesthesia Release from PACU Note    Patient: Ghazala Mera    Procedure(s) Performed: * No procedures listed *    Anesthesia type: general    Post pain: Adequate analgesia    Post assessment: no apparent anesthetic complications    Last Vitals:   Visit Vitals  /68   Pulse 87   Temp 37.2 °C (98.9 °F) (Oral)   Resp 18   Ht 5' 3" (1.6 m)   Wt 92 kg (202 lb 13.2 oz)   LMP 07/02/2017   SpO2 100%   Breastfeeding? No   BMI 35.93 kg/m²       Post vital signs: stable    Level of consciousness: awake    Nausea/Vomiting: no nausea/no vomiting    Complications: none    Airway Patency: patent    Respiratory: unassisted    Cardiovascular: stable and blood pressure at baseline    Hydration: euvolemic  "

## 2018-05-16 NOTE — ANESTHESIA POSTPROCEDURE EVALUATION
"Anesthesia Post Evaluation    Patient: Ghazala Mera    Procedure(s) Performed: * No procedures listed *    Final Anesthesia Type: general  Patient location during evaluation: labor & delivery  Patient participation: Yes- Able to Participate  Level of consciousness: awake and alert  Post-procedure vital signs: reviewed and stable  Pain management: adequate  Airway patency: patent  PONV status at discharge: No PONV  Anesthetic complications: no      Cardiovascular status: blood pressure returned to baseline  Respiratory status: unassisted  Hydration status: euvolemic  Follow-up not needed.        Visit Vitals  /68   Pulse 87   Temp 37.2 °C (98.9 °F) (Oral)   Resp 18   Ht 5' 3" (1.6 m)   Wt 92 kg (202 lb 13.2 oz)   LMP 07/02/2017   SpO2 100%   Breastfeeding? No   BMI 35.93 kg/m²       Pain/Mallika Score: Pain Rating Prior to Med Admin: 8 (5/14/2018  6:05 PM)  Pain Rating Post Med Admin: 6 (5/14/2018  7:00 PM)  Mallika Score: 8 (5/15/2018  7:00 PM)      "

## 2018-05-16 NOTE — PROGRESS NOTES
[facility] -   Obstetrics  Postpartum Progress Note    Patient Name: Ghazala Mera  MRN: [id number]  Admission Date: 2018  Hospital Length of Stay: 3 days  Attending Physician: Alyssa Moe MD  Primary Care Provider: Halle Srivastava MD    Subjective:     Principal Problem:S/P  section    Hospital course: Admit to L&D  Cervidil IOL  Begin cytotec 50mcg po  1900 Cytotec vaginally 25 mcg, leaking clear fluid nitrazine negative   requesting epidural  5/15/18 -AROM Pitocin at 4mu    Interval History:     She is doing well this morning. She is tolerating a regular diet without nausea or vomiting. She is voiding spontaneously. She is ambulating. She has passed flatus, and has not a BM. Vaginal bleeding is mild. She denies fever or chills. Abdominal pain is mild and controlled with oral medications. She is breastfeeding. She desires circumcision for her male baby: yes.    Objective:     Vital Signs (Most Recent):  Temp: 98.3 °F (36.8 °C) (18 0400)  Pulse: 86 (18 0400)  Resp: 18 (18 0400)  BP: 112/68 (18 0400)  SpO2: 95 % (05/15/18 2000) Vital Signs (24h Range):  Temp:  [97.3 °F (36.3 °C)-99 °F (37.2 °C)] 98.3 °F (36.8 °C)  Pulse:  [] 86  Resp:  [14-18] 18  SpO2:  [95 %-100 %] 95 %  BP: ()/() 112/68     Weight: 92 kg (202 lb 13.2 oz)  Body mass index is 35.93 kg/m².      Intake/Output Summary (Last 24 hours) at 18 0756  Last data filed at 18 0600   Gross per 24 hour   Intake             2000 ml   Output             3400 ml   Net            -1400 ml       Significant Labs:  Lab Results   Component Value Date    GROUPTRH O POS 2018    HEPBSAG Negative 2017    STREPBCULT No Group B Streptococcus isolated 2018     No results for input(s): HGB, HCT in the last 48 hours.    I have personallly reviewed all pertinent lab results from the last 24 hours.    Physical Exam:   Constitutional: She is oriented to person, place, and  time. She appears well-developed and well-nourished.    HENT:   Head: Normocephalic.     Neck: Normal range of motion. No thyromegaly present.    Cardiovascular: Normal rate and regular rhythm.     Pulmonary/Chest: Effort normal and breath sounds normal.        Abdominal: Soft. Bowel sounds are normal. She exhibits abdominal incision (aquasel dressing clean dry & intact).     Genitourinary:   Genitourinary Comments: Fundus nontender near the umbilicus             Musculoskeletal: Normal range of motion and moves all extremeties.       Neurological: She is alert and oriented to person, place, and time.    Skin: Skin is warm.    Psychiatric: She has a normal mood and affect.       Assessment/Plan:     22 y.o. female  for:    * S/P  section    POD#1 s/p primary LTCS due to failure to progress.  Patient doing well.  Discussed goals for discharge and encouraged ambulation.  Anticipate stable for discharge in 1-2 days.          Failure to progress in labor    Has made no further cervical change  Patient is ready to proceed with primary c/section  Reviewed c/section procedure in detail. Reviewed risks including but not limited to infection, bleeding, damage to bowel/bladder, cva;htn, death.  All questions answered to the best of my ability.  Alternatives reviewedConsents signed and witnessed.  Case request submitted  Ancef/azithromycin preop  OR/Anesthesia aware          Post-term pregnancy, 40-42 weeks of gestation    S/p cervidil  S/p cytotec  AROM clear fluid  Patient making slow progress  fht reassuring  Patient and family want to continue to labor; aware if non reassuring fetal status or cervix becomes more swollen and no cervical change would recommend c/section  (discussed with CORI Cleary CNM)        History of depression    Prozac Daily            Disposition: As patient meets milestones, will plan to discharge within 1-2 days.    Joan Cheung PA-C  Obstetrics  [facility]

## 2018-05-16 NOTE — PLAN OF CARE
Problem: Patient Care Overview  Goal: Plan of Care Review  Outcome: Ongoing (interventions implemented as appropriate)  Patient progressing well.  Pain well controlled with IV torodol and po pain medications.  Has ambulated independently with minimal assist.  In & out cath done after patient unable to void 6 hours after urinary cath removed.  Patient encouraged to increase po fluid intake.  Lochia remains light throughout this shift.  VSS.

## 2018-05-17 VITALS
HEART RATE: 93 BPM | BODY MASS INDEX: 35.93 KG/M2 | TEMPERATURE: 98 F | OXYGEN SATURATION: 95 % | DIASTOLIC BLOOD PRESSURE: 77 MMHG | WEIGHT: 202.81 LBS | HEIGHT: 63 IN | SYSTOLIC BLOOD PRESSURE: 116 MMHG | RESPIRATION RATE: 20 BRPM

## 2018-05-17 PROBLEM — O48.0 POST-TERM PREGNANCY, 40-42 WEEKS OF GESTATION: Status: RESOLVED | Noted: 2018-05-13 | Resolved: 2018-05-17

## 2018-05-17 PROBLEM — O40.3XX0 POLYHYDRAMNIOS IN THIRD TRIMESTER: Status: RESOLVED | Noted: 2018-04-11 | Resolved: 2018-05-17

## 2018-05-17 PROBLEM — O48.0 POST-DATES PREGNANCY: Status: RESOLVED | Noted: 2018-05-15 | Resolved: 2018-05-17

## 2018-05-17 PROCEDURE — 25000003 PHARM REV CODE 250: Performed by: OBSTETRICS & GYNECOLOGY

## 2018-05-17 RX ORDER — OXYCODONE AND ACETAMINOPHEN 5; 325 MG/1; MG/1
1 TABLET ORAL EVERY 6 HOURS PRN
Qty: 30 TABLET | Refills: 0 | Status: SHIPPED | OUTPATIENT
Start: 2018-05-17 | End: 2018-06-21

## 2018-05-17 RX ORDER — IBUPROFEN 800 MG/1
800 TABLET ORAL EVERY 8 HOURS
Qty: 60 TABLET | Refills: 0 | Status: SHIPPED | OUTPATIENT
Start: 2018-05-17 | End: 2018-06-21 | Stop reason: ALTCHOICE

## 2018-05-17 RX ADMIN — OXYCODONE HYDROCHLORIDE AND ACETAMINOPHEN 1 TABLET: 5; 325 TABLET ORAL at 01:05

## 2018-05-17 RX ADMIN — DOCUSATE SODIUM 200 MG: 100 CAPSULE, LIQUID FILLED ORAL at 09:05

## 2018-05-17 RX ADMIN — IBUPROFEN 800 MG: 800 TABLET ORAL at 05:05

## 2018-05-17 RX ADMIN — OXYCODONE HYDROCHLORIDE AND ACETAMINOPHEN 1 TABLET: 10; 325 TABLET ORAL at 10:05

## 2018-05-17 NOTE — PLAN OF CARE
Problem: Patient Care Overview  Goal: Plan of Care Review  Outcome: Ongoing (interventions implemented as appropriate)  Pt progressing well. Up ad kaylynn. Encouraging pt to drink plenty of water and void frequently. Bleeding is light. Pain controlled with po meds. Vitals stable. Bonding with baby. Breastfeeding.

## 2018-05-17 NOTE — LACTATION NOTE
"Lactation Rounds:    I attempted to make rounds a few times and mother has been asleep. I was able to speak with her a few minutes. She states that last night was "horrible" and she was up most of the night. The infant was cluster feeding. She states that at this time she would like to rest more and will call for assistance with next feeding. She states that she had some trouble latching infant a few times last night and used the help of the nurse. Encouraged mother to call for latch assistance with next feeding and for her discharge instructions.  Infants weight loss wnl. Infants intake and output wnl. Reinforced infant feeding & output pattern, cue based feeds & unrestricted access to the breast. Hand expression reviewed. Mother denies any further needs or concerns at this time. Mother verbalizes understanding of education.     05/17/18 0940   Infant Assessment   Weight Loss (%) -4.2   Number of Stools (24 hours) 2   Number of Voids (24 hours) 4   Maternal Infant Feeding   Breastfeeding Education adequate infant intake;adequate milk volume;importance of skin-to-skin contact;milk expression, hand;increasing milk supply;label/storage of breast milk   Lactation Interventions   Attachment Promotion counseling provided;face-to-face positioning promoted;family involvement promoted;infant-mother separation minimized;role responsibility promoted;privacy provided;rooming-in promoted;skin-to-skin contact encouraged   Breastfeeding Assistance support offered;feeding cue recognition promoted;feeding on demand promoted   Maternal Breastfeeding Support diary/feeding log utilized;encouragement offered;infant-mother separation minimized;lactation counseling provided;maternal hydration promoted;maternal nutrition promoted;maternal rest encouraged     "

## 2018-05-17 NOTE — DISCHARGE INSTRUCTIONS
"Mother Self Care:    Activity: Avoid strenuous exercise and get adequate rest.  No driving until the physician consent given.  Emotional Changes: Most women find birth to be a time of great emotional upheaval.  Sense of loss, mood swings, fatigue, anxiety, and feeling "let down" are common.  If feelings worsen or last more than a week, call your physician.  Breast Care/Breastfeeding: Wear a bra for comfort.  Keep nipples dry and apply your own breast milk or lanolin cream as needed for soreness.  Engorgement can be relieved with warm, moist heat before feedings.  You may also take Ibuprofen.  Ryan-Care/Vaginal Bleeding: Remember to use your ryan-bottle after urinating.  Your flow will change from red, to pink, to yellow/white color over a period of 2 weeks.  Menstruation will return in 3-8 weeks, or longer if breastfeeding.   Section/Tubal Ligation: You may shower, but avoid baths. Do not peel back edges of dressing. Return to clinic in one week for dressing removal.  Sexual Activity/Pelvic Rest: No sexual activity, tampons, or douching until your physician gives you consent.  Diet: Continue to eat from the five basic food groups, including plenty of protein, fruits, vegetables, and whole grains.  Limit empty calories and high fat foods.  Drink enough fluids to satisfy thirst and add an extra 500 calories for breastfeeding.  Constipation/Hemorrhoids: Drink plenty of water.  You may take a stool softener or natural laxative (Metamucil). You may use tucks or hemorrhoid ointment and soak in a warm tub.    CALL YOUR OB DOCTOR IF ANY OF THE FOLLOWING OCCURS:  *Heavy bleeding - saturating a pad an hour or passing any large (2-3 inches in size) blood clots.  *Any pain, redness, or tenderness in lower leg.  *You cannot care for yourself or your baby.  *Any signs of infection-      - Temperature greater than 100.5 degrees F      - Foul smelling vaginal discharge and/or incisional drainage      - Increased incisional " pain      - Hot, hard, red or sore area on breast      - Flu-like symptoms      - Any urgency, frequency or burning with urination    Return To the Hospital for further Evaluation:  · Headache not relieved by tylenol or ibuprofen  · Blurry vision, double vision, seeing spots, or flashing lights  · Feeling faint or passing out  · Right epigastric pain  · Difficulty breathing  · Swelling in hands, face, or feet  · Any of these symptoms accompanied by nausea/vomiting  · Gaining more than 5 pounds in one week  · Seizures  These symptoms could be an indication of elevated blood pressure.       If you have any questions that need to be answered immediately please call the Labor & Delivery Unit at [phone] and ask to speak to a nurse.

## 2018-05-17 NOTE — LACTATION NOTE
"Lactation Rounds:    Mother placed infant in football hold to the right breast. She had some complaints of nipple soreness. Assessment of the latch reveals a shallow latch. Discussed with mother how to check for shallow latch and that pain may be associated with a shallow latch along with flat or creased nipples. Infant very sleepy and waking techniques used. Infant now ready for feeding. Infant placed back to right breast in football hold. Infant obtained a few shallow latches before being able to obtain a deep asymmetric latch. Mother states she can feel the difference in latches. She has no pain with this latch. Infant fed until content and released breast. Nipple shape and color wnl. Infant placed skin to skin. Encouraged mother to call for assistance with latch as needed.     Lactation discharge information reviewed.  Mother is aware of warm line, and outpatient consultations and monthly support gatherings. Encouraged mother to contact lactation with any questions, concerns, or problems. Contact numbers provided, and mother verbalizes understanding.     05/17/18 1030   Maternal Infant Assessment   Breast Shape Bilateral:;round   Breast Density Bilateral:;soft   Areola Bilateral:;elastic   Nipple(s) Bilateral:;graspable   LATCH Score   Latch 1-->repeated attempts, holds nipple in mouth, stimulate to suck   Audible Swallowing 1-->a few with stimulation   Type Of Nipple 2-->everted (after stimulation)   Comfort (Breast/Nipple) 1-->filling, red/small blisters/bruises, mild/mod discomfort   Hold (Positioning) 1-->minimal assist, teach one side: mother does other, staff holds   Score (less than 7 for 2/more consecutive times, consult Lactation Consultant) 6   Maternal Infant Feeding   Infant Positioning clutch/"football"   Signs of Milk Transfer audible swallow;infant jaw motion present;suck/swallow ratio   Presence of Pain yes   Pain Location nipple, right   Nipple Shape After Feeding, Right wnl   Breastfeeding " Education adequate infant intake;adequate milk volume;diet;importance of skin-to-skin contact;increasing milk supply;label/storage of breast milk;milk expression, hand;milk expression, electric pump   Lactation Interventions   Attachment Promotion breastfeeding assistance provided;environment adjusted;face-to-face positioning promoted;family involvement promoted;infant-mother separation minimized;privacy provided;role responsibility promoted;rooming-in promoted;skin-to-skin contact encouraged   Breast Care: Breastfeeding milk massaged towards nipple   Breastfeeding Assistance assisted with positioning;both breasts offered each feeding;feeding cue recognition promoted;feeding on demand promoted;feeding session observed;infant stimulated to wakeful state;infant suck/swallow verified;support offered   Maternal Breastfeeding Support diary/feeding log utilized;encouragement offered;infant-mother separation minimized;lactation counseling provided;maternal hydration promoted;maternal nutrition promoted;maternal rest encouraged   Latch Promotion suck stimulated with colostrum drop

## 2018-05-18 ENCOUNTER — TELEPHONE (OUTPATIENT)
Dept: OBSTETRICS AND GYNECOLOGY | Facility: CLINIC | Age: 23
End: 2018-05-18

## 2018-05-18 NOTE — TELEPHONE ENCOUNTER
----- Message from Karina Callahan sent at 2018 12:26 PM CDT -----  Contact: self [phone]  States that she is calling to schedule an appt to come in for bandage removal following . Please call back at [phone]//thank you acc

## 2018-05-18 NOTE — TELEPHONE ENCOUNTER
Pt. Needed appt. Scheduled for dressing removal. Made pt. Appt. And informed pt. Date time and location. Pt. Voiced understanding.

## 2018-05-18 NOTE — DISCHARGE SUMMARY
[facility] -   Obstetrics  Discharge Summary      Patient Name: Ghazala Mera  MRN: [id number]  Admission Date: 2018  Hospital Length of Stay: 4 days  Discharge Date and Time:  2018 6:41 AM  Attending Physician: No att. providers found   Discharging Provider: Joan Cheung PA-C  Primary Care Provider: Halle Srivastava MD    HPI: Pt here for contractions and pressure    Procedure(s) (LRB):  DELIVERY- SECTION (N/A)     Hospital Course:   Admit to L&D  Cervidil IOL  Begin cytotec 50mcg po  1900 Cytotec vaginally 25 mcg, leaking clear fluid nitrazine negative   requesting epidural  5/15/18 -AROM Pitocin at 4mu        Final Active Diagnoses:    Diagnosis Date Noted POA    PRINCIPAL PROBLEM:  S/P  section [Z98.891] 05/15/2018 Not Applicable    Failure to progress in labor [O62.2] 05/15/2018 No    History of depression [Z86.59] 2017 Not Applicable      Problems Resolved During this Admission:    Diagnosis Date Noted Date Resolved POA    Post-dates pregnancy [O48.0] 05/15/2018 05/15/2018 Yes    Post-dates pregnancy [O48.0] 05/15/2018 2018 Yes    Post-term pregnancy, 40-42 weeks of gestation [O48.0] 2018 Yes    Post-dates pregnancy [O48.0] 2018 Yes    Polyhydramnios in third trimester [O40.3XX0] 2018 Yes        Labs: All labs within the past 24 hours have been reviewed    Feeding Method: breast    Immunizations     None          Delivery:    Episiotomy: None   Lacerations: None   Repair suture:     Repair # of packets:     Blood loss (ml): 800     Birth information:  YOB: 2019   Time of birth: 5:31 PM   Sex: male   Delivery type: , Low Transverse   Gestational Age: 41w0d    Delivery Clinician:      Other providers:       Additional  information:  Forceps:    Vacuum:    Breech:    Observed anomalies      Living?:           APGARS  One minute Five minutes Ten minutes   Skin color:          Heart rate:         Grimace:         Muscle tone:         Breathing:         Totals: 5  7        Placenta: Delivered:       appearance    Pending Diagnostic Studies:     None          Discharged Condition: good    Disposition: Home or Self Care    Follow Up:  Follow-up Information     Minoo Malloy MD In 4 weeks.    Specialty:  Obstetrics and Gynecology  Why:  Post op visit  Contact information:  [address] [id number]  [phone]             OB GYN NURSE, ONADI In 1 week.    Why:  Bandage removal               Patient Instructions:     Call MD for:  temperature >100.4     Call MD for:  severe uncontrolled pain     Call MD for:  redness, tenderness, or signs of infection (pain, swelling, redness, odor or green/yellow discharge around incision site)     Call MD for:  severe persistent headache     Call MD for:  persistent dizziness, light-headedness, or visual disturbances     Leave dressing on - Keep it clean, dry, and intact until clinic visit   Order Comments: Showers only- no baths.       Medications:  Discharge Medication List as of 5/17/2018 11:51 AM      START taking these medications    Details   ibuprofen (ADVIL,MOTRIN) 800 MG tablet Take 1 tablet (800 mg total) by mouth every 8 (eight) hours., Starting Thu 5/17/2018, Normal      oxyCODONE-acetaminophen (PERCOCET) 5-325 mg per tablet Take 1 tablet by mouth every 6 (six) hours as needed for Pain., Starting Thu 5/17/2018, Normal         CONTINUE these medications which have NOT CHANGED    Details   FLUoxetine (PROZAC) 20 MG capsule Take 1 capsule (20 mg total) by mouth once daily., Starting Mon 12/4/2017, Until Tue 12/4/2018, Normal      PNV NO.5/IRON ASP GLY/DOCUS/FA (PRENATAL VIT #5-IRON-FA-DSS ORAL) Take 1 tablet by mouth once daily., Historical Med      promethazine (PHENERGAN) 25 MG tablet Take 1 tablet (25 mg total) by mouth every 6 (six) hours as needed for Nausea., Starting Tue 11/7/2017, Normal             Joan Cheung,  [place]  Obstetrics  [facility]

## 2018-05-23 ENCOUNTER — TELEPHONE (OUTPATIENT)
Dept: OBSTETRICS AND GYNECOLOGY | Facility: CLINIC | Age: 23
End: 2018-05-23

## 2018-05-23 ENCOUNTER — OFFICE VISIT (OUTPATIENT)
Dept: OBSTETRICS AND GYNECOLOGY | Facility: CLINIC | Age: 23
End: 2018-05-23
Payer: COMMERCIAL

## 2018-05-23 VITALS — SYSTOLIC BLOOD PRESSURE: 121 MMHG | DIASTOLIC BLOOD PRESSURE: 73 MMHG

## 2018-05-23 DIAGNOSIS — Z98.891 S/P CESAREAN SECTION: ICD-10-CM

## 2018-05-23 PROCEDURE — 99024 POSTOP FOLLOW-UP VISIT: CPT | Mod: S$GLB,,, | Performed by: OBSTETRICS & GYNECOLOGY

## 2018-05-23 PROCEDURE — 99999 PR PBB SHADOW E&M-EST. PATIENT-LVL I: CPT | Mod: PBBFAC,,, | Performed by: OBSTETRICS & GYNECOLOGY

## 2018-05-23 RX ORDER — FUROSEMIDE 40 MG/1
40 TABLET ORAL DAILY
Qty: 10 TABLET | Refills: 0 | Status: SHIPPED | OUTPATIENT
Start: 2018-05-23 | End: 2018-06-21 | Stop reason: ALTCHOICE

## 2018-05-23 NOTE — TELEPHONE ENCOUNTER
Seen for dressing removal today;   please schedule 4-6 wk postpartum check    Ok to send appt time/date to patient portal

## 2018-05-23 NOTE — TELEPHONE ENCOUNTER
Left messages for the pt. to call back. krystyna tamez    Left a message and a mychart message informing the pt. That she will need to come for her appt, after 2pm today because Dr. Malloy will not be in [place] until after 2pm. krystyna Tamez

## 2018-05-23 NOTE — PROGRESS NOTES
Subjective:       Patient ID: Ghazala Mera is a 22 y.o. female.    Chief Complaint:  No chief complaint on file.      History of Present Illness  HPI  Postoperative Follow-up  Patient presents to the clinic 1 weeks status post  for failure to progress. Eating a regular diet without difficulty. Bowel movements are normal. Pain is controlled with current analgesics. Medications being used: norco/ibuprofen.  Reports no prob void; minimal vaginal bleeding  Breast feeding  Emotionally well; has continued lexapro  C/o lower leg swelling  Denies preE symptoms  GYN & OB History  Patient's last menstrual period was 2017.   Date of Last Pap: No result found    OB History    Para Term  AB Living   3       2     SAB TAB Ectopic Multiple Live Births   2              # Outcome Date GA Lbr Boyd/2nd Weight Sex Delivery Anes PTL Lv   3 Current            2 SAB            1 SAB                   Review of Systems  Review of Systems   Cardiovascular: Positive for leg swelling.           Objective:    Physical Exam:   Constitutional: She appears well-developed.     Eyes: Conjunctivae and EOM are normal. Pupils are equal, round, and reactive to light.    Neck: Normal range of motion. Neck supple.     Pulmonary/Chest: Effort normal.        Abdominal: Soft. Bowel sounds are normal. She exhibits abdominal incision.   Dressing removed; no errythema, exudate induration; healing well             Musculoskeletal: Normal range of motion. She exhibits edema.       Neurological: She is alert.    Skin: Skin is warm.    Psychiatric: She has a normal mood and affect.          Assessment:        Encounter Diagnosis   Name Primary?    S/P  section Yes               Plan:       Continue post op care  Pelvic rest  acceps lasix  Advised continue prenatal vitamin as directed  F/u 4-6 wks

## 2018-06-21 ENCOUNTER — OFFICE VISIT (OUTPATIENT)
Dept: OBSTETRICS AND GYNECOLOGY | Facility: CLINIC | Age: 23
End: 2018-06-21
Payer: COMMERCIAL

## 2018-06-21 VITALS
BODY MASS INDEX: 30.86 KG/M2 | HEIGHT: 63 IN | SYSTOLIC BLOOD PRESSURE: 112 MMHG | WEIGHT: 174.19 LBS | DIASTOLIC BLOOD PRESSURE: 70 MMHG

## 2018-06-21 DIAGNOSIS — Z30.09 ENCOUNTER FOR OTHER GENERAL COUNSELING OR ADVICE ON CONTRACEPTION: ICD-10-CM

## 2018-06-21 DIAGNOSIS — Z12.4 SCREENING FOR CERVICAL CANCER: ICD-10-CM

## 2018-06-21 PROCEDURE — 88175 CYTOPATH C/V AUTO FLUID REDO: CPT

## 2018-06-21 PROCEDURE — 99999 PR PBB SHADOW E&M-EST. PATIENT-LVL III: CPT | Mod: PBBFAC,,, | Performed by: OBSTETRICS & GYNECOLOGY

## 2018-06-21 PROCEDURE — 0503F POSTPARTUM CARE VISIT: CPT | Mod: S$GLB,,, | Performed by: OBSTETRICS & GYNECOLOGY

## 2018-06-21 RX ORDER — ACETAMINOPHEN AND CODEINE PHOSPHATE 120; 12 MG/5ML; MG/5ML
1 SOLUTION ORAL DAILY
Qty: 28 TABLET | Refills: 11 | Status: SHIPPED | OUTPATIENT
Start: 2018-06-21 | End: 2020-06-09

## 2018-06-21 NOTE — PROGRESS NOTES
Subjective:       Patient ID: Ghazala Mera is a 22 y.o. female.    Chief Complaint:  Bowel issues      History of Present Illness  HPI  Postoperative Follow-up  Patient presents to the clinic 5 weeks status post  for failure to progress. Eating a regular diet without difficulty. Bowel movements are normal--but very small balls and noticing blood;. The patient is not having any pain.  No prob with bladder leakage  Breast feeding q 3 hrs  Motherhood is great  Taking prozac as directed  No intercourse    GYN & OB History  Patient's last menstrual period was 2017.   Date of Last Pap: No result found    OB History    Para Term  AB Living   3       2     SAB TAB Ectopic Multiple Live Births   2              # Outcome Date GA Lbr Boyd/2nd Weight Sex Delivery Anes PTL Lv   3 Current            2 SAB            1 SAB                   Review of Systems  Review of Systems   Constitutional: Negative for activity change, appetite change, chills, diaphoresis, fatigue, fever and unexpected weight change.   HENT: Negative for mouth sores and tinnitus.    Eyes: Negative for discharge and visual disturbance.   Respiratory: Negative for cough, shortness of breath and wheezing.    Cardiovascular: Negative for chest pain, palpitations and leg swelling.   Gastrointestinal: Negative for abdominal pain, bloating, blood in stool, constipation, diarrhea, nausea and vomiting.        Rectal bleeding     Endocrine: Negative for diabetes, hair loss, hot flashes, hyperthyroidism and hypothyroidism.   Genitourinary: Negative for decreased libido, dyspareunia, dysuria, flank pain, frequency, genital sores, hematuria, menorrhagia, menstrual problem, pelvic pain, urgency, vaginal bleeding, vaginal discharge, vaginal pain, dysmenorrhea, urinary incontinence, postcoital bleeding, postmenopausal bleeding and vaginal odor.   Musculoskeletal: Negative for back pain and myalgias.   Skin:  Negative for rash, no acne and hair  changes.   Neurological: Negative for seizures, syncope, numbness and headaches.   Hematological: Negative for adenopathy. Does not bruise/bleed easily.   Psychiatric/Behavioral: Negative for depression and sleep disturbance. The patient is not nervous/anxious.    Breast: Negative for breast mass, breast pain, nipple discharge and skin changes          Objective:    Physical Exam:   Constitutional: She appears well-developed.     Eyes: Conjunctivae and EOM are normal. Pupils are equal, round, and reactive to light.    Neck: Normal range of motion. Neck supple.     Pulmonary/Chest: Effort normal. Right breast exhibits no mass, no nipple discharge, no skin change, no tenderness and presence. Left breast exhibits no mass, no nipple discharge, no skin change, no tenderness and presence. Breasts are symmetrical.        Abdominal: Soft. She exhibits abdominal incision (well healed).     Genitourinary: Uterus normal. Pelvic exam was performed with patient supine. Vaginal discharge (lochia) found.           Musculoskeletal: Normal range of motion.       Neurological: She is alert.    Skin: Skin is warm.    Psychiatric: She has a normal mood and affect.          Assessment:     Encounter Diagnoses   Name Primary?    Postpartum care following  delivery Yes    Encounter for other general counseling or advice on contraception     Screening for cervical cancer              Plan:    add colace; if bowels improve in caliber but still with bleeding; pt to self refer to gi or pcp  Released from [facility]  rx sent for micronor  Continue po hydration/prenatal vitamin  Ok to exercise  Pap today; Reviewed updated recommendations for pap smears (every 3 years) in low risk patients.   Recommend annual pelvic exams.  Reviewed recommendations for annual CBE.

## 2019-02-07 ENCOUNTER — OFFICE VISIT (OUTPATIENT)
Dept: OBSTETRICS AND GYNECOLOGY | Facility: CLINIC | Age: 24
End: 2019-02-07
Payer: COMMERCIAL

## 2019-02-07 VITALS
SYSTOLIC BLOOD PRESSURE: 104 MMHG | HEIGHT: 63 IN | DIASTOLIC BLOOD PRESSURE: 62 MMHG | WEIGHT: 172.38 LBS | BODY MASS INDEX: 30.54 KG/M2

## 2019-02-07 DIAGNOSIS — N39.44 NOCTURNAL ENURESIS: Primary | ICD-10-CM

## 2019-02-07 PROBLEM — Z98.891 S/P CESAREAN SECTION: Status: RESOLVED | Noted: 2018-05-15 | Resolved: 2019-02-07

## 2019-02-07 PROBLEM — Z86.59 HISTORY OF DEPRESSION: Status: RESOLVED | Noted: 2017-12-04 | Resolved: 2019-02-07

## 2019-02-07 PROCEDURE — 99214 PR OFFICE/OUTPT VISIT, EST, LEVL IV, 30-39 MIN: ICD-10-PCS | Mod: S$GLB,,, | Performed by: OBSTETRICS & GYNECOLOGY

## 2019-02-07 PROCEDURE — 87086 URINE CULTURE/COLONY COUNT: CPT

## 2019-02-07 PROCEDURE — 99999 PR PBB SHADOW E&M-EST. PATIENT-LVL III: CPT | Mod: PBBFAC,,, | Performed by: OBSTETRICS & GYNECOLOGY

## 2019-02-07 PROCEDURE — 99999 PR PBB SHADOW E&M-EST. PATIENT-LVL III: ICD-10-PCS | Mod: PBBFAC,,, | Performed by: OBSTETRICS & GYNECOLOGY

## 2019-02-07 PROCEDURE — 99214 OFFICE O/P EST MOD 30 MIN: CPT | Mod: S$GLB,,, | Performed by: OBSTETRICS & GYNECOLOGY

## 2019-02-07 RX ORDER — OXYBUTYNIN CHLORIDE 5 MG/1
5 TABLET ORAL NIGHTLY
Qty: 30 TABLET | Refills: 6 | Status: SHIPPED | OUTPATIENT
Start: 2019-02-07 | End: 2020-06-09

## 2019-02-07 NOTE — PROGRESS NOTES
Subjective:       Patient ID: Ghazala Mera is a 23 y.o. female.    Chief Complaint:  check up      History of Present Illness  HPI  here for problem   C/o enuresis since c/section  Denies dysuria, urgency  Denies leak of urine during the day    GYN & OB History  Patient's last menstrual period was 2017 (exact date).   Date of Last Pap: 2018    OB History    Para Term  AB Living   3 1 1   2 1   SAB TAB Ectopic Multiple Live Births   2       1      # Outcome Date GA Lbr Boyd/2nd Weight Sex Delivery Anes PTL Lv   3 Term      CS-Unspec   ZACK   2 SAB            1 SAB                   Review of Systems  Review of Systems   All other systems reviewed and are negative.          Objective:      Physical Exam:   Constitutional: She appears well-developed.     Eyes: Conjunctivae and EOM are normal. Pupils are equal, round, and reactive to light.    Neck: Normal range of motion. Neck supple.     Pulmonary/Chest: Effort normal. Right breast exhibits no mass, no nipple discharge, no skin change, no tenderness and presence. Left breast exhibits no mass, no nipple discharge, no skin change, no tenderness and presence. Breasts are symmetrical.        Abdominal: Soft.     Genitourinary: Vagina normal and uterus normal. Pelvic exam was performed with patient supine.           Musculoskeletal: Normal range of motion.       Neurological: She is alert.    Skin: Skin is warm.    Psychiatric: She has a normal mood and affect.           Assessment:        1. Nocturnal enuresis               Plan:      Advised stop liquids 1-2 hrs before usually stops  Empty bladder prior to bedtime  Accepts trial of oxybutynin

## 2019-02-09 LAB — BACTERIA UR CULT: NO GROWTH

## 2019-12-09 ENCOUNTER — TELEPHONE (OUTPATIENT)
Dept: OBSTETRICS AND GYNECOLOGY | Facility: CLINIC | Age: 24
End: 2019-12-09

## 2019-12-09 NOTE — TELEPHONE ENCOUNTER
Patient pretty sure she a recurring BV but has not been seen in 10 months.  Appointment scheduled tomorrow with PA at [facility].

## 2019-12-09 NOTE — TELEPHONE ENCOUNTER
----- Message from Nadine Jo sent at 12/9/2019  4:44 PM CST -----  Ghazala Mera calling regarding Patient Advice (message) to speak with the nurse concerning Bacteria vaginal infection pt can be reached at [phone]. Pt is requesting to speak with the nurse to get medication to be called in to pharmacy.  [facility] pharmacy phone [phone]    Thank you!

## 2019-12-10 ENCOUNTER — TELEPHONE (OUTPATIENT)
Dept: OBSTETRICS AND GYNECOLOGY | Facility: CLINIC | Age: 24
End: 2019-12-10

## 2020-06-08 ENCOUNTER — TELEPHONE (OUTPATIENT)
Dept: OBSTETRICS AND GYNECOLOGY | Facility: CLINIC | Age: 25
End: 2020-06-08

## 2020-06-09 ENCOUNTER — OFFICE VISIT (OUTPATIENT)
Dept: OBSTETRICS AND GYNECOLOGY | Facility: CLINIC | Age: 25
End: 2020-06-09
Payer: COMMERCIAL

## 2020-06-09 VITALS
WEIGHT: 154.19 LBS | HEIGHT: 63 IN | SYSTOLIC BLOOD PRESSURE: 98 MMHG | BODY MASS INDEX: 27.32 KG/M2 | DIASTOLIC BLOOD PRESSURE: 72 MMHG

## 2020-06-09 DIAGNOSIS — N76.0 ACUTE VAGINITIS: Primary | ICD-10-CM

## 2020-06-09 PROCEDURE — 99213 OFFICE O/P EST LOW 20 MIN: CPT | Mod: S$GLB,,, | Performed by: NURSE PRACTITIONER

## 2020-06-09 PROCEDURE — 99999 PR PBB SHADOW E&M-EST. PATIENT-LVL II: ICD-10-PCS | Mod: PBBFAC,,, | Performed by: NURSE PRACTITIONER

## 2020-06-09 PROCEDURE — 99999 PR PBB SHADOW E&M-EST. PATIENT-LVL II: CPT | Mod: PBBFAC,,, | Performed by: NURSE PRACTITIONER

## 2020-06-09 PROCEDURE — 87801 DETECT AGNT MULT DNA AMPLI: CPT

## 2020-06-09 PROCEDURE — 87481 CANDIDA DNA AMP PROBE: CPT | Mod: 59

## 2020-06-09 PROCEDURE — 99213 PR OFFICE/OUTPT VISIT, EST, LEVL III, 20-29 MIN: ICD-10-PCS | Mod: S$GLB,,, | Performed by: NURSE PRACTITIONER

## 2020-06-09 NOTE — PROGRESS NOTES
Subjective:       Patient ID: Ghazala Mera is a 24 y.o. female.    Chief Complaint:  vaginal bump      History of Present Illness  HPI  Ingrown hair that started after she had a baby  Does not have a head; feels that it is a ball under her skin  Painful when it flares and gets inflamed x1 week and a half  Believes she has BV -- d/c with an odor x a week and a half  Showers with vagisil wash; denies douching    GYN & OB History  Patient's last menstrual period was 2020 (exact date).   Date of Last Pap: 2018    OB History    Para Term  AB Living   3 1 1   2 1   SAB TAB Ectopic Multiple Live Births   2       1      # Outcome Date GA Lbr Boyd/2nd Weight Sex Delivery Anes PTL Lv   3 Term      CS-Unspec   ZACK   2 SAB            1 SAB                Review of Systems  Review of Systems   Constitutional: Negative for chills, fatigue and fever.   Gastrointestinal: Negative for abdominal pain.   Genitourinary: Positive for vaginal discharge and vaginal odor. Negative for dysmenorrhea, dyspareunia, dysuria, menstrual problem and pelvic pain.        Bump to R side of vagina   Musculoskeletal: Negative for back pain.   All other systems reviewed and are negative.          Objective:      Physical Exam:   Constitutional: She is oriented to person, place, and time. She appears well-developed and well-nourished.    HENT:   Head: Normocephalic and atraumatic.    Eyes: Pupils are equal, round, and reactive to light. Conjunctivae and EOM are normal.    Neck: Normal range of motion. Neck supple.    Cardiovascular: Normal rate.     Pulmonary/Chest: Effort normal.        Abdominal: Soft. Hernia confirmed negative in the right inguinal area and confirmed negative in the left inguinal area.     Genitourinary: Rectum normal and uterus normal. Rectal exam shows no external hemorrhoid. Pelvic exam was performed with patient supine. There is no rash, tenderness, lesion or injury on the right labia. There is no rash,  tenderness, lesion or injury on the left labia. Uterus is not tender. Cervix is normal. Right adnexum displays no mass, no tenderness and no fullness. Left adnexum displays no mass, no tenderness and no fullness. No erythema, tenderness or bleeding in the vagina. No foreign body in the vagina. No signs of injury around the vagina. Vaginal discharge (small amt white thin) found. Cervix exhibits no motion tenderness and no friability.           Musculoskeletal: Normal range of motion and moves all extremeties.      Lymphadenopathy:        Right: Inguinal (oval shaped, about 1cm tender to touch; no erythema visible edema) adenopathy present.        Left: No inguinal adenopathy present.    Neurological: She is alert and oriented to person, place, and time.    Skin: Skin is warm and dry.    Psychiatric: She has a normal mood and affect. Her behavior is normal. Judgment and thought content normal.           Assessment:        1. Acute vaginitis       Plan:   Affirm collected.  Patient was counseled today on vaginitis prevention including :  a. avoiding feminine products such as deoderant soaps, body wash, bubble bath, douches, scented toilet paper, deoderant tampons or pads, feminine wipes, chronic pad use, etc.  b. avoiding other vulvovaginal irritants such as long hot baths, humidity, tight, synthetic clothing, chlorine and sitting around in wet bathing suits  c. wearing cotton underwear, avoiding thong underwear and no underwear to bed  d. taking showers instead of baths and use a hair dryer on cool setting afterwards to dry  e. wearing cotton to exercise and shower immediately after exercise and change clothes  f. using polyurethane condoms without spermicide if sexually active and symptoms are triggered by intercourse  Continue annual well woman exam in 1 month

## 2020-06-10 LAB
BACTERIAL VAGINOSIS DNA: POSITIVE
CANDIDA GLABRATA DNA: NEGATIVE
CANDIDA KRUSEI DNA: NEGATIVE
CANDIDA RRNA VAG QL PROBE: NEGATIVE
T VAGINALIS RRNA GENITAL QL PROBE: NEGATIVE

## 2020-06-12 ENCOUNTER — TELEPHONE (OUTPATIENT)
Dept: OBSTETRICS AND GYNECOLOGY | Facility: CLINIC | Age: 25
End: 2020-06-12

## 2020-06-12 ENCOUNTER — PATIENT MESSAGE (OUTPATIENT)
Dept: OBSTETRICS AND GYNECOLOGY | Facility: CLINIC | Age: 25
End: 2020-06-12

## 2020-06-12 DIAGNOSIS — B96.89 BV (BACTERIAL VAGINOSIS): Primary | ICD-10-CM

## 2020-06-12 DIAGNOSIS — N76.0 BV (BACTERIAL VAGINOSIS): Primary | ICD-10-CM

## 2020-06-12 RX ORDER — METRONIDAZOLE 500 MG/1
500 TABLET ORAL 2 TIMES DAILY
Qty: 14 TABLET | Refills: 0 | Status: SHIPPED | OUTPATIENT
Start: 2020-06-12 | End: 2020-06-19

## 2020-06-12 NOTE — TELEPHONE ENCOUNTER
Pt prefers pills. krystyna Arellano    Spoke to the pt and advised her that her vaginal culture is positive for bacterial vaginosis.  This is overgrowth of your normal bacteria.  Pt acknowledged understanding, slee,lpn

## 2020-08-14 ENCOUNTER — OFFICE VISIT (OUTPATIENT)
Dept: OBSTETRICS AND GYNECOLOGY | Facility: CLINIC | Age: 25
End: 2020-08-14
Payer: COMMERCIAL

## 2020-08-14 VITALS
WEIGHT: 153.13 LBS | SYSTOLIC BLOOD PRESSURE: 108 MMHG | HEIGHT: 63 IN | DIASTOLIC BLOOD PRESSURE: 72 MMHG | BODY MASS INDEX: 27.13 KG/M2

## 2020-08-14 DIAGNOSIS — N60.12 FIBROCYSTIC DISEASE OF LEFT BREAST: ICD-10-CM

## 2020-08-14 DIAGNOSIS — Z01.419 ENCOUNTER FOR GYNECOLOGICAL EXAMINATION (GENERAL) (ROUTINE) WITHOUT ABNORMAL FINDINGS: Primary | ICD-10-CM

## 2020-08-14 DIAGNOSIS — N76.4 VULVAR ABSCESS: ICD-10-CM

## 2020-08-14 DIAGNOSIS — Z12.4 SCREENING FOR CERVICAL CANCER: ICD-10-CM

## 2020-08-14 PROCEDURE — 99999 PR PBB SHADOW E&M-EST. PATIENT-LVL III: CPT | Mod: PBBFAC,,, | Performed by: OBSTETRICS & GYNECOLOGY

## 2020-08-14 PROCEDURE — 99999 PR PBB SHADOW E&M-EST. PATIENT-LVL III: ICD-10-PCS | Mod: PBBFAC,,, | Performed by: OBSTETRICS & GYNECOLOGY

## 2020-08-14 PROCEDURE — 99395 PR PREVENTIVE VISIT,EST,18-39: ICD-10-PCS | Mod: S$GLB,,, | Performed by: OBSTETRICS & GYNECOLOGY

## 2020-08-14 PROCEDURE — 99395 PREV VISIT EST AGE 18-39: CPT | Mod: S$GLB,,, | Performed by: OBSTETRICS & GYNECOLOGY

## 2020-08-14 RX ORDER — SULFAMETHOXAZOLE AND TRIMETHOPRIM 400; 80 MG/1; MG/1
1 TABLET ORAL 2 TIMES DAILY
Qty: 20 TABLET | Refills: 0 | Status: SHIPPED | OUTPATIENT
Start: 2020-08-14 | End: 2020-08-24

## 2020-08-14 NOTE — PROGRESS NOTES
Subjective:       Patient ID: Ghazala Mera is a 25 y.o. female.    Chief Complaint:  Well Woman      History of Present Illness  HPI  Annual Exam-Premenopausal  Patient presents for annual exam. The patient reports breast tenderness on left and vulvar swelling on right; The patient is sexually active--condoms occas . GYN screening history: last pap: approximate date  and was normal. The patient wears seatbelts: yes. 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    Menses monthly, flow 4-6d, tampon-reg; change q 3-4 hrs; no double up; denies dysmenorrhea;       GYN & OB History  Patient's last menstrual period was 2020 (approximate).   Date of Last Pap: 2018    OB History    Para Term  AB Living   3 1 1   2 1   SAB TAB Ectopic Multiple Live Births   2       1      # Outcome Date GA Lbr Boyd/2nd Weight Sex Delivery Anes PTL Lv   3 Term      CS-Unspec   ZACK   2 SAB            1 SAB                Review of Systems  Review of Systems   Genitourinary: Positive for vaginal pain.   Integumentary:  Positive for breast tenderness.   All other systems reviewed and are negative.  Breast: Positive for tenderness.          Objective:      Physical Exam:   Constitutional: She appears well-developed.     Eyes: Pupils are equal, round, and reactive to light. Conjunctivae and EOM are normal.    Neck: Normal range of motion. Neck supple.     Pulmonary/Chest: Effort normal. Right breast exhibits no mass, no nipple discharge, no skin change and no tenderness. Left breast exhibits no mass, no nipple discharge, no skin change and no tenderness. Breasts are symmetrical.            Abdominal: Soft.     Genitourinary:    Vagina, uterus, right adnexa, left adnexa and rectum normal.            Pelvic exam was performed with patient  supine.   Cervix is normal. There is a normal right adnexa and a normal left adnexa. Right adnexum displays no mass and no tenderness. Left adnexum displays no mass and no tenderness. No erythema, bleeding, rectocele, cystocele or unspecified prolapse of vaginal walls in the vagina. Labial bartholins normal.negative for vaginal discharge       Uterus Size: 6 cm   Musculoskeletal: Normal range of motion.       Neurological: She is alert.    Skin: Skin is warm.    Psychiatric: She has a normal mood and affect.           Assessment:     Encounter Diagnoses   Name Primary?    Vulvar abscess     Encounter for gynecological examination (general) (routine) without abnormal findings Yes    Screening for cervical cancer     Fibrocystic disease of left breast              Plan:      Continue annual well woman exam.  Pap 2018, due in 2021; Reviewed updated recommendations for pap smears (every 3 years) in low risk patients.   Recommend annual pelvic exams.  Reviewed recommendations for annual CBE.  Advised decrease caffeine add evening primrose oil for breast tenderness  Septra given for vulvar cyst; warm compresses tid; if becomes more fluctuant --return for I&d  Continue diet, exercise, weight loss

## 2021-04-07 ENCOUNTER — TELEPHONE (OUTPATIENT)
Dept: OBSTETRICS AND GYNECOLOGY | Facility: CLINIC | Age: 26
End: 2021-04-07

## 2021-04-08 ENCOUNTER — TELEPHONE (OUTPATIENT)
Dept: OBSTETRICS AND GYNECOLOGY | Facility: CLINIC | Age: 26
End: 2021-04-08

## 2023-04-10 ENCOUNTER — OFFICE VISIT (OUTPATIENT)
Dept: OBSTETRICS AND GYNECOLOGY | Facility: CLINIC | Age: 28
End: 2023-04-10
Payer: COMMERCIAL

## 2023-04-10 VITALS
SYSTOLIC BLOOD PRESSURE: 106 MMHG | BODY MASS INDEX: 24.18 KG/M2 | HEIGHT: 63 IN | WEIGHT: 136.44 LBS | DIASTOLIC BLOOD PRESSURE: 70 MMHG

## 2023-04-10 DIAGNOSIS — Z12.4 SCREENING FOR CERVICAL CANCER: ICD-10-CM

## 2023-04-10 DIAGNOSIS — Z01.419 ENCOUNTER FOR GYNECOLOGICAL EXAMINATION (GENERAL) (ROUTINE) WITHOUT ABNORMAL FINDINGS: Primary | ICD-10-CM

## 2023-04-10 DIAGNOSIS — R30.0 DYSURIA: ICD-10-CM

## 2023-04-10 PROCEDURE — 88175 CYTOPATH C/V AUTO FLUID REDO: CPT | Performed by: OBSTETRICS & GYNECOLOGY

## 2023-04-10 PROCEDURE — 1159F PR MEDICATION LIST DOCUMENTED IN MEDICAL RECORD: ICD-10-PCS | Mod: CPTII,S$GLB,, | Performed by: OBSTETRICS & GYNECOLOGY

## 2023-04-10 PROCEDURE — 3074F SYST BP LT 130 MM HG: CPT | Mod: CPTII,S$GLB,, | Performed by: OBSTETRICS & GYNECOLOGY

## 2023-04-10 PROCEDURE — 99999 PR PBB SHADOW E&M-EST. PATIENT-LVL III: CPT | Mod: PBBFAC,,, | Performed by: OBSTETRICS & GYNECOLOGY

## 2023-04-10 PROCEDURE — 99395 PREV VISIT EST AGE 18-39: CPT | Mod: S$GLB,,, | Performed by: OBSTETRICS & GYNECOLOGY

## 2023-04-10 PROCEDURE — 99999 PR PBB SHADOW E&M-EST. PATIENT-LVL III: ICD-10-PCS | Mod: PBBFAC,,, | Performed by: OBSTETRICS & GYNECOLOGY

## 2023-04-10 PROCEDURE — 87086 URINE CULTURE/COLONY COUNT: CPT | Performed by: OBSTETRICS & GYNECOLOGY

## 2023-04-10 PROCEDURE — 3074F PR MOST RECENT SYSTOLIC BLOOD PRESSURE < 130 MM HG: ICD-10-PCS | Mod: CPTII,S$GLB,, | Performed by: OBSTETRICS & GYNECOLOGY

## 2023-04-10 PROCEDURE — 99395 PR PREVENTIVE VISIT,EST,18-39: ICD-10-PCS | Mod: S$GLB,,, | Performed by: OBSTETRICS & GYNECOLOGY

## 2023-04-10 PROCEDURE — 3078F PR MOST RECENT DIASTOLIC BLOOD PRESSURE < 80 MM HG: ICD-10-PCS | Mod: CPTII,S$GLB,, | Performed by: OBSTETRICS & GYNECOLOGY

## 2023-04-10 PROCEDURE — 3078F DIAST BP <80 MM HG: CPT | Mod: CPTII,S$GLB,, | Performed by: OBSTETRICS & GYNECOLOGY

## 2023-04-10 PROCEDURE — 3008F BODY MASS INDEX DOCD: CPT | Mod: CPTII,S$GLB,, | Performed by: OBSTETRICS & GYNECOLOGY

## 2023-04-10 PROCEDURE — 3008F PR BODY MASS INDEX (BMI) DOCUMENTED: ICD-10-PCS | Mod: CPTII,S$GLB,, | Performed by: OBSTETRICS & GYNECOLOGY

## 2023-04-10 PROCEDURE — 1159F MED LIST DOCD IN RCRD: CPT | Mod: CPTII,S$GLB,, | Performed by: OBSTETRICS & GYNECOLOGY

## 2023-04-10 RX ORDER — NITROFURANTOIN 25; 75 MG/1; MG/1
100 CAPSULE ORAL 2 TIMES DAILY
Qty: 10 CAPSULE | Refills: 0 | Status: SHIPPED | OUTPATIENT
Start: 2023-04-10 | End: 2023-04-15

## 2023-04-10 NOTE — PROGRESS NOTES
"Subjective:       Patient ID: Ghazala Mera is a 27 y.o. female.    Chief Complaint:  Well Woman      History of Present Illness  HPI  Annual Exam-Premenopausal  Patient presents for annual exam. The patient has no complaints today. The patient is sexually active.--using "pull out "  GYN screening history: last pap: approximate date 2018 and was normal. The patient wears seatbelts: yes. The patient participates in regular exercise: yes. --walks 15k steps; Has the patient ever been transfused or tattooed?: yes. --+tattooes; The patient reports that there is not domestic violence in her life.  Menses monthly 5 days;tampons--super for 1 days; change q 2-3 hrs; tolerable dysmenorrhea;   No problems sleeping    Works in [employer] dept at the [employer]  GYN & OB History  Patient's last menstrual period was 2023 (exact date).   Date of Last Pap: No result found    OB History    Para Term  AB Living   3 1 1   2 1   SAB IAB Ectopic Multiple Live Births   2       1      # Outcome Date GA Lbr Boyd/2nd Weight Sex Delivery Anes PTL Lv   3 Term      CS-Unspec   ZACK   2 SAB            1 SAB                Review of Systems  Review of Systems   All other systems reviewed and are negative.        Objective:      Physical Exam:   Constitutional: She is oriented to person, place, and time. She appears well-developed and well-nourished.     Eyes: Pupils are equal, round, and reactive to light. Conjunctivae and EOM are normal.      Pulmonary/Chest: Effort normal. Right breast exhibits no mass, no nipple discharge, no skin change and no tenderness. Left breast exhibits no mass, no nipple discharge, no skin change and no tenderness. Breasts are symmetrical.        Abdominal: Soft.     Genitourinary:    Vagina, uterus, right adnexa, left adnexa and rectum normal.      Pelvic exam was performed with patient supine.   The external female genitalia was normal.   Labial bartholins normal.Cervix is normal. Right adnexum " displays no mass and no tenderness. Left adnexum displays no mass and no tenderness. No erythema,  no vaginal discharge, bleeding, rectocele, cystocele or unspecified prolapse of vaginal walls in the vagina. Vagina was moist.Uerus contour normal  Normal urethral meatus.Urethra findings: no urethral massBladder findings: no bladder distention and no bladder tenderness          Musculoskeletal: Normal range of motion and moves all extremeties.       Neurological: She is alert and oriented to person, place, and time.    Skin: Skin is warm.    Psychiatric: She has a normal mood and affect. Her behavior is normal.         Assessment:        1. Encounter for gynecological examination (general) (routine) without abnormal findings    2. Dysuria    3. Screening for cervical cancer               Plan:      Continue annual well woman exam.  Pap today; Reviewed updated recommendations for pap smears (every 3 years) in low risk patients.   Recommend annual pelvic exams.  Reviewed recommendations for annual CBE.  Continue menstrual calendar  Ucx today; rx sent for macrobid  Continue diet, exercise,

## 2023-04-11 LAB — BACTERIA UR CULT: NO GROWTH

## 2023-04-12 NOTE — PROGRESS NOTES
The urine culture is negative  Please avoid the bladder irritants (carbonated soft drinks) and increase water

## 2023-04-19 LAB
FINAL PATHOLOGIC DIAGNOSIS: NORMAL
Lab: NORMAL

## 2023-04-19 NOTE — PROGRESS NOTES
Good News! Your pap smear came back and it was normal.   I still recommend doing a pelvic exam and annual visit every year, but you only need the pap test every 3 years. Please call me if you have any further questions.   Sincerely,   Dr. Malloy

## (undated) DEVICE — DRESSING AQUACEL FOAM 3 X 3